# Patient Record
Sex: MALE | HISPANIC OR LATINO | Employment: OTHER | URBAN - METROPOLITAN AREA
[De-identification: names, ages, dates, MRNs, and addresses within clinical notes are randomized per-mention and may not be internally consistent; named-entity substitution may affect disease eponyms.]

---

## 2019-05-06 ENCOUNTER — HOSPITAL ENCOUNTER (INPATIENT)
Facility: OTHER | Age: 34
LOS: 2 days | Discharge: HOME | DRG: 392 | End: 2019-05-08
Attending: EMERGENCY MEDICINE | Admitting: INTERNAL MEDICINE

## 2019-05-06 DIAGNOSIS — K57.92 ACUTE DIVERTICULITIS: Primary | ICD-10-CM

## 2019-05-06 DIAGNOSIS — K57.32 DIVERTICULITIS OF SIGMOID COLON: ICD-10-CM

## 2019-05-06 DIAGNOSIS — K63.1 COLON PERFORATION: ICD-10-CM

## 2019-05-06 LAB
ALBUMIN SERPL BCP-MCNC: 3.8 G/DL (ref 3.5–5.2)
ALP SERPL-CCNC: 70 U/L (ref 55–135)
ALT SERPL W/O P-5'-P-CCNC: 26 U/L (ref 10–44)
ANION GAP SERPL CALC-SCNC: 8 MMOL/L (ref 8–16)
AST SERPL-CCNC: 11 U/L (ref 10–40)
BACTERIA #/AREA URNS HPF: NORMAL /HPF
BASOPHILS # BLD AUTO: 0.03 K/UL (ref 0–0.2)
BASOPHILS NFR BLD: 0.2 % (ref 0–1.9)
BILIRUB SERPL-MCNC: 1.1 MG/DL (ref 0.1–1)
BILIRUB UR QL STRIP: NEGATIVE
BUN SERPL-MCNC: 14 MG/DL (ref 6–20)
CALCIUM SERPL-MCNC: 9.5 MG/DL (ref 8.7–10.5)
CHLORIDE SERPL-SCNC: 108 MMOL/L (ref 95–110)
CLARITY UR: CLEAR
CO2 SERPL-SCNC: 24 MMOL/L (ref 23–29)
COLOR UR: YELLOW
CREAT SERPL-MCNC: 1.1 MG/DL (ref 0.5–1.4)
DIFFERENTIAL METHOD: ABNORMAL
EOSINOPHIL # BLD AUTO: 0.1 K/UL (ref 0–0.5)
EOSINOPHIL NFR BLD: 0.9 % (ref 0–8)
ERYTHROCYTE [DISTWIDTH] IN BLOOD BY AUTOMATED COUNT: 13.2 % (ref 11.5–14.5)
EST. GFR  (AFRICAN AMERICAN): >60 ML/MIN/1.73 M^2
EST. GFR  (NON AFRICAN AMERICAN): >60 ML/MIN/1.73 M^2
GLUCOSE SERPL-MCNC: 103 MG/DL (ref 70–110)
GLUCOSE UR QL STRIP: NEGATIVE
HCT VFR BLD AUTO: 42.6 % (ref 40–54)
HGB BLD-MCNC: 14.3 G/DL (ref 14–18)
HGB UR QL STRIP: ABNORMAL
KETONES UR QL STRIP: NEGATIVE
LACTATE SERPL-SCNC: 1.1 MMOL/L (ref 0.5–2.2)
LEUKOCYTE ESTERASE UR QL STRIP: NEGATIVE
LYMPHOCYTES # BLD AUTO: 2.7 K/UL (ref 1–4.8)
LYMPHOCYTES NFR BLD: 19.7 % (ref 18–48)
MCH RBC QN AUTO: 29.6 PG (ref 27–31)
MCHC RBC AUTO-ENTMCNC: 33.6 G/DL (ref 32–36)
MCV RBC AUTO: 88 FL (ref 82–98)
MICROSCOPIC COMMENT: NORMAL
MONOCYTES # BLD AUTO: 1.2 K/UL (ref 0.3–1)
MONOCYTES NFR BLD: 8.9 % (ref 4–15)
NEUTROPHILS # BLD AUTO: 9.4 K/UL (ref 1.8–7.7)
NEUTROPHILS NFR BLD: 69.9 % (ref 38–73)
NITRITE UR QL STRIP: NEGATIVE
PH UR STRIP: 6 [PH] (ref 5–8)
PLATELET # BLD AUTO: 281 K/UL (ref 150–350)
PMV BLD AUTO: 11.5 FL (ref 9.2–12.9)
POTASSIUM SERPL-SCNC: 3.9 MMOL/L (ref 3.5–5.1)
PROT SERPL-MCNC: 7.7 G/DL (ref 6–8.4)
PROT UR QL STRIP: NEGATIVE
RBC # BLD AUTO: 4.83 M/UL (ref 4.6–6.2)
RBC #/AREA URNS HPF: 2 /HPF (ref 0–4)
SODIUM SERPL-SCNC: 140 MMOL/L (ref 136–145)
SP GR UR STRIP: 1.02 (ref 1–1.03)
URN SPEC COLLECT METH UR: ABNORMAL
UROBILINOGEN UR STRIP-ACNC: NEGATIVE EU/DL
WBC # BLD AUTO: 13.45 K/UL (ref 3.9–12.7)

## 2019-05-06 PROCEDURE — 96375 TX/PRO/DX INJ NEW DRUG ADDON: CPT

## 2019-05-06 PROCEDURE — 94761 N-INVAS EAR/PLS OXIMETRY MLT: CPT

## 2019-05-06 PROCEDURE — 99285 EMERGENCY DEPT VISIT HI MDM: CPT | Mod: 25

## 2019-05-06 PROCEDURE — 25000003 PHARM REV CODE 250: Performed by: PHYSICIAN ASSISTANT

## 2019-05-06 PROCEDURE — 96365 THER/PROPH/DIAG IV INF INIT: CPT

## 2019-05-06 PROCEDURE — 63600175 PHARM REV CODE 636 W HCPCS: Performed by: PHYSICIAN ASSISTANT

## 2019-05-06 PROCEDURE — 96361 HYDRATE IV INFUSION ADD-ON: CPT

## 2019-05-06 PROCEDURE — 11000001 HC ACUTE MED/SURG PRIVATE ROOM

## 2019-05-06 PROCEDURE — 81000 URINALYSIS NONAUTO W/SCOPE: CPT

## 2019-05-06 PROCEDURE — 80053 COMPREHEN METABOLIC PANEL: CPT

## 2019-05-06 PROCEDURE — 99223 PR INITIAL HOSPITAL CARE,LEVL III: ICD-10-PCS | Mod: ,,, | Performed by: PHYSICIAN ASSISTANT

## 2019-05-06 PROCEDURE — 85025 COMPLETE CBC W/AUTO DIFF WBC: CPT

## 2019-05-06 PROCEDURE — 87040 BLOOD CULTURE FOR BACTERIA: CPT | Mod: 59

## 2019-05-06 PROCEDURE — 83605 ASSAY OF LACTIC ACID: CPT

## 2019-05-06 PROCEDURE — 99223 1ST HOSP IP/OBS HIGH 75: CPT | Mod: ,,, | Performed by: PHYSICIAN ASSISTANT

## 2019-05-06 RX ORDER — SODIUM CHLORIDE 9 MG/ML
INJECTION, SOLUTION INTRAVENOUS CONTINUOUS
Status: DISCONTINUED | OUTPATIENT
Start: 2019-05-06 | End: 2019-05-08

## 2019-05-06 RX ORDER — KETOROLAC TROMETHAMINE 30 MG/ML
10 INJECTION, SOLUTION INTRAMUSCULAR; INTRAVENOUS
Status: COMPLETED | OUTPATIENT
Start: 2019-05-06 | End: 2019-05-06

## 2019-05-06 RX ORDER — MORPHINE SULFATE 10 MG/ML
4 INJECTION INTRAMUSCULAR; INTRAVENOUS; SUBCUTANEOUS
Status: COMPLETED | OUTPATIENT
Start: 2019-05-06 | End: 2019-05-06

## 2019-05-06 RX ORDER — SODIUM CHLORIDE 9 MG/ML
1000 INJECTION, SOLUTION INTRAVENOUS
Status: COMPLETED | OUTPATIENT
Start: 2019-05-06 | End: 2019-05-06

## 2019-05-06 RX ORDER — MORPHINE SULFATE 2 MG/ML
2 INJECTION, SOLUTION INTRAMUSCULAR; INTRAVENOUS EVERY 4 HOURS PRN
Status: DISCONTINUED | OUTPATIENT
Start: 2019-05-06 | End: 2019-05-06

## 2019-05-06 RX ORDER — SODIUM CHLORIDE 0.9 % (FLUSH) 0.9 %
10 SYRINGE (ML) INJECTION
Status: DISCONTINUED | OUTPATIENT
Start: 2019-05-06 | End: 2019-05-08 | Stop reason: HOSPADM

## 2019-05-06 RX ORDER — MORPHINE SULFATE 2 MG/ML
1 INJECTION, SOLUTION INTRAMUSCULAR; INTRAVENOUS EVERY 4 HOURS PRN
Status: DISCONTINUED | OUTPATIENT
Start: 2019-05-06 | End: 2019-05-08 | Stop reason: HOSPADM

## 2019-05-06 RX ADMIN — KETOROLAC TROMETHAMINE 10 MG: 30 INJECTION, SOLUTION INTRAMUSCULAR at 04:05

## 2019-05-06 RX ADMIN — SODIUM CHLORIDE 1000 ML: 0.9 INJECTION, SOLUTION INTRAVENOUS at 05:05

## 2019-05-06 RX ADMIN — PIPERACILLIN AND TAZOBACTAM 4.5 G: 4; .5 INJECTION, POWDER, LYOPHILIZED, FOR SOLUTION INTRAVENOUS; PARENTERAL at 05:05

## 2019-05-06 RX ADMIN — SODIUM CHLORIDE: 0.9 INJECTION, SOLUTION INTRAVENOUS at 08:05

## 2019-05-06 RX ADMIN — MORPHINE SULFATE 4 MG: 10 INJECTION INTRAVENOUS at 05:05

## 2019-05-06 RX ADMIN — MORPHINE SULFATE 1 MG: 2 INJECTION, SOLUTION INTRAMUSCULAR; INTRAVENOUS at 09:05

## 2019-05-06 RX ADMIN — SODIUM CHLORIDE 1000 ML: 0.9 INJECTION, SOLUTION INTRAVENOUS at 04:05

## 2019-05-06 NOTE — ED NOTES
Maribel Linton PA-C at  reviewing results and discussing plan of care with patient and pt's family member.

## 2019-05-06 NOTE — ED TRIAGE NOTES
Pt reports testicle pain since last night. Reports pain originates in lower abd and radiates down into her testicle. Pt reports body aches. Denies any fever or n/v/d. Reports dysuria.

## 2019-05-06 NOTE — ED NOTES
Rounding on the patient has been done. he has been updated on the plan of care and his current status. Pain was assessed and is currently a 3/10. Comfort positioning and restroom needs were addressed. Necessary items were placed with in his reach and he was advised when a reassessment would take place. The call bell remains at the bedside for any additional patient needs. The patient is resting comfortably on the stretcher, respirations are even and unlabored, skin warm and dry. Pt's dtr at BS. Will continue to monitor.

## 2019-05-06 NOTE — HPI
Mr. Ulloa is a 34 year old man who presented to the ED with 2 days of intermittent sharp pains in both testicles associated with urinary frequency, dysuria and myalgias.  He does not have any testicular swelling or injury.  Evaluation in the ED showed a leukocytosis.  Clean catch UA showed 1+ blood and no leukocytes.  CT of the abdomen and pelvis was done and showed diverticulosis with mid sigmoid diverticulitis with possible micro perforations but no organized fluid collection.  He was started on Zosyn and admitted.

## 2019-05-06 NOTE — ED NOTES
NPO:   NPO status maintained and reinforced. Pt verbalized understanding. Will continue to monitor.

## 2019-05-06 NOTE — ED PROVIDER NOTES
Encounter Date: 5/6/2019       History     Chief Complaint   Patient presents with    Testicle Pain     Pt reports testicular pain since last night. Denies swelling. Pt reports dysuria and frequency.      Patient is a 34-year-old Arabic-speaking male with no significant medical history who presents to the emergency department with his daughter.  Daughter is translating for him.  Patient states over the last 2 days he has had sharp pains radiating to both testicles.  He states the pain is intermittent.  He states the pain is worse with movement.  He denies nausea or vomiting.  He denies diarrhea.  He denies blood per rectum.  He does report urinary frequency and dysuria.  He denies any swelling noted to his testicles.  He denies any known injury. He states over the last 12 hr he has developed myalgias.    The history is provided by the patient and a relative. The history is limited by a language barrier.  used: lacey.     Review of patient's allergies indicates:  No Known Allergies  No past medical history on file.  No past surgical history on file.  No family history on file.  Social History     Tobacco Use    Smoking status: Not on file   Substance Use Topics    Alcohol use: Not on file    Drug use: Not on file     Review of Systems   Constitutional: Negative for activity change, appetite change, chills, fatigue and fever.   HENT: Negative for congestion, ear discharge, ear pain, postnasal drip, rhinorrhea, sore throat and trouble swallowing.    Respiratory: Negative for cough and shortness of breath.    Cardiovascular: Negative for chest pain.   Gastrointestinal: Negative for abdominal pain, blood in stool, constipation, diarrhea, nausea and vomiting.   Genitourinary: Positive for dysuria and testicular pain. Negative for flank pain and hematuria.   Musculoskeletal: Positive for myalgias. Negative for back pain, neck pain and neck stiffness.   Skin: Negative for rash and wound.    Neurological: Negative for dizziness, weakness, light-headedness and headaches.       Physical Exam     Initial Vitals [05/06/19 1503]   BP Pulse Resp Temp SpO2   136/76 92 18 98.9 °F (37.2 °C) 99 %      MAP       --         Physical Exam    Nursing note and vitals reviewed.  Constitutional: He appears well-developed and well-nourished. He is not diaphoretic.  Non-toxic appearance. No distress.   HENT:   Head: Normocephalic.   Right Ear: Hearing and external ear normal.   Left Ear: Hearing and external ear normal.   Nose: Nose normal.   Mouth/Throat: Uvula is midline, oropharynx is clear and moist and mucous membranes are normal. No oropharyngeal exudate.   Eyes: Conjunctivae are normal. Pupils are equal, round, and reactive to light.   Neck: Normal range of motion.   Cardiovascular: Normal rate and regular rhythm.   Pulmonary/Chest: Breath sounds normal.   Abdominal: Soft. Bowel sounds are normal. He exhibits no distension and no mass. There is tenderness. There is guarding. There is no rebound (suprapubic), no CVA tenderness, no tenderness at McBurney's point and negative Temple's sign.   Genitourinary: Testes normal. Right testis shows no mass, no swelling and no tenderness. Cremasteric reflex is not absent on the right side. Left testis shows no mass, no swelling and no tenderness. Cremasteric reflex is not absent on the left side. Uncircumcised. No penile erythema. No discharge found.   Lymphadenopathy:     He has no cervical adenopathy. No inguinal adenopathy noted on the right or left side.   Neurological: He is alert and oriented to person, place, and time.   Skin: Skin is warm and dry. Capillary refill takes less than 2 seconds.   Psychiatric: He has a normal mood and affect.         ED Course   Procedures  Labs Reviewed   URINALYSIS, REFLEX TO URINE CULTURE - Abnormal; Notable for the following components:       Result Value    Occult Blood UA 1+ (*)     All other components within normal limits     Narrative:     Preferred Collection Type->Urine, Clean Catch   CBC W/ AUTO DIFFERENTIAL - Abnormal; Notable for the following components:    WBC 13.45 (*)     Gran # (ANC) 9.4 (*)     Mono # 1.2 (*)     All other components within normal limits   COMPREHENSIVE METABOLIC PANEL - Abnormal; Notable for the following components:    Total Bilirubin 1.1 (*)     All other components within normal limits   CULTURE, BLOOD   CULTURE, BLOOD   URINALYSIS MICROSCOPIC    Narrative:     Preferred Collection Type->Urine, Clean Catch   LACTIC ACID, PLASMA          Imaging Results           CT Renal Stone Study ABD Pelvis WO (Final result)  Result time 05/06/19 16:36:12    Final result by Kody Palmer MD (05/06/19 16:36:12)                 Impression:      This report was flagged in Epic as abnormal.    1. Findings most consistent with acute sigmoid diverticulitis.  Punctate foci of air adjacent to the involve segment suggest sequela of micro perforation without focal organized fluid collection at this time.  Early abscess however is not excluded in this region.  Follow-up is advised.  Additionally, consider follow-up colonoscopy to exclude underlying mass.  2. Bilateral nonobstructive nephrolithiasis without findings to suggest obstructive uropathy.  3. Possible hepatic steatosis, correlation with LFTs recommended.  4. 0.4 cm pulmonary nodule within the left lower lobe.  For a solid nodule <6 mm, Fleischner Society 2017 guidelines recommend no routine follow up for a low risk patient, or follow-up with non-contrast chest CT at 12 months in a high risk patient.  5. Additional findings above.      Electronically signed by: Kody Palmer MD  Date:    05/06/2019  Time:    16:36             Narrative:    EXAMINATION:  CT RENAL STONE STUDY ABD PELVIS WO    CLINICAL HISTORY:  Hematuria;    TECHNIQUE:  Low dose axial images, sagittal and coronal reformations were obtained from the lung bases to the pubic symphysis.  Contrast was not  administered.    COMPARISON:  None    FINDINGS:  Images of the lower thorax are remarkable for bilateral dependent atelectasis.  There is a pulmonary nodule within the left lower lobe measuring 0.4 cm.    There is mild gynecomastia.  The liver is slightly hypoattenuating, possibly reflecting steatosis, correlation with LFTs recommended.  The spleen, pancreas, gallbladder and adrenal glands are grossly unremarkable.  The stomach is mildly distended with ingested content.  The pancreatic duct is not dilated.  No significant abdominal lymphadenopathy.    There is left and right nonobstructive nephrolithiasis.  No hydronephrosis.  The bilateral ureters are unremarkable without calculi seen.  The urinary bladder is decompressed.  The prostate is not enlarged.    There are a few scattered colonic diverticula.  There is inflammation and indistinctness about a short segment of mid sigmoid colon, in the region of diverticula most consistent with acute diverticulitis.  There are a few punctate foci of air along the medial margin of the affected bowel, concerning for small contained micro perforation.  No large volume free air or focal organized collection at this time.  There is strand-like fluid in the region.  Diverticula are noted elsewhere along the colon without additional regions of inflammation.  The appendix and terminal ileum are unremarkable.  The small bowel is grossly unremarkable.  No bulky pelvic lymphadenopathy.    No focal osseous destructive process.  No significant inguinal lymphadenopathy.                                 Medical Decision Making:   Initial Assessment:   Urgent evaluation of a 34-year-old male with no significant medical history who presents to the emergency department with testicular pain.  Patient is afebrile, nontoxic appearing, and hemodynamically stable. On exam, no acute findings of the testicles.  No adenopathy.  No hernia noted. There is tenderness in the suprapubic region.  No CVA  tenderness. With patient's initial complaints and presentation, my differential diagnosis included UTI, STD, renal colic, obstructive nephrolithiasis.  Lab work and CT renal stone study will be obtained at this time.  Patient will be given fluids and Toradol.    Clinical Tests:   Lab Tests: Ordered and Reviewed  Radiological Study: Ordered and Reviewed  ED Management:  1535  U/A reveals blood.    1640  Ct scan reveals acute sigmoid diverticulitis with micro perforation noted adjacent to the involving segment.  Early abscess is not excluded.  There is incidental finding of bilateral nonobstructive nephrolithiasis.  There is incidental pulmonary nodule noted.  Pt is advised of all findings.  I have paged general surgery at this time to discuss case.    6110  Dr. Bains advised admission to hospital medicine.  Have contacted case management to determine if patient meets obs or inpatient criteria.    5:14 PM  Pt does meet inpatient.  Will page hospital medicine right now.    5:20 PM  Admitted to Dr. Hoang.  Other:   I have discussed this case with another health care provider.       <> Summary of the Discussion: Dr. Bains, Dr. Ott, Dr. Nuñez                      Clinical Impression:       ICD-10-CM ICD-9-CM   1. Acute diverticulitis K57.92 562.11   2. Colon perforation K63.1 569.83                                Maribel Hairston PA-C  05/06/19 4215

## 2019-05-06 NOTE — ED NOTES
ROUNDING:  Lying on the stretcher with HOB elevated. AAOx4. Calm, cooperative and smiling. States abdominal (below umbilicus) pain  6/10. States pain comes and goes.  States lower back pain has resolved. Denies n/v, cp, sob, dizziness, lightheadedness or any other discomfort at this time. Skin is warm and dry. Resp:18 even and unlabored. Comfort and BR needs addressed. Plan of care updated. NADN. Dtr at BS. Bed locked in low position, side rails up x2 and call light within reach. Will continue to monitor.

## 2019-05-07 PROBLEM — K57.32 DIVERTICULITIS OF SIGMOID COLON: Status: ACTIVE | Noted: 2019-05-06

## 2019-05-07 PROCEDURE — 94761 N-INVAS EAR/PLS OXIMETRY MLT: CPT

## 2019-05-07 PROCEDURE — 11000001 HC ACUTE MED/SURG PRIVATE ROOM

## 2019-05-07 PROCEDURE — 99233 PR SUBSEQUENT HOSPITAL CARE,LEVL III: ICD-10-PCS | Mod: ,,, | Performed by: HOSPITALIST

## 2019-05-07 PROCEDURE — 99233 SBSQ HOSP IP/OBS HIGH 50: CPT | Mod: ,,, | Performed by: HOSPITALIST

## 2019-05-07 PROCEDURE — 63600175 PHARM REV CODE 636 W HCPCS: Performed by: PHYSICIAN ASSISTANT

## 2019-05-07 PROCEDURE — 25000003 PHARM REV CODE 250: Performed by: PHYSICIAN ASSISTANT

## 2019-05-07 RX ORDER — ENOXAPARIN SODIUM 100 MG/ML
40 INJECTION SUBCUTANEOUS EVERY 24 HOURS
Status: DISCONTINUED | OUTPATIENT
Start: 2019-05-07 | End: 2019-05-08 | Stop reason: HOSPADM

## 2019-05-07 RX ADMIN — PIPERACILLIN AND TAZOBACTAM 4.5 G: 4; .5 INJECTION, POWDER, LYOPHILIZED, FOR SOLUTION INTRAVENOUS; PARENTERAL at 05:05

## 2019-05-07 RX ADMIN — PIPERACILLIN AND TAZOBACTAM 4.5 G: 4; .5 INJECTION, POWDER, LYOPHILIZED, FOR SOLUTION INTRAVENOUS; PARENTERAL at 10:05

## 2019-05-07 RX ADMIN — ENOXAPARIN SODIUM 40 MG: 100 INJECTION SUBCUTANEOUS at 05:05

## 2019-05-07 RX ADMIN — SODIUM CHLORIDE: 0.9 INJECTION, SOLUTION INTRAVENOUS at 04:05

## 2019-05-07 RX ADMIN — PIPERACILLIN AND TAZOBACTAM 4.5 G: 4; .5 INJECTION, POWDER, LYOPHILIZED, FOR SOLUTION INTRAVENOUS; PARENTERAL at 12:05

## 2019-05-07 NOTE — PLAN OF CARE
Problem: Adult Inpatient Plan of Care  Goal: Plan of Care Review  Outcome: Ongoing (interventions implemented as appropriate)  Patient free from falls or injury throughout shift. Pain treated with IV pain meds per MAR. Purposeful rounding completed. All safety measures in place. Will continue to monitor.

## 2019-05-07 NOTE — PROGRESS NOTES
[facility] Hospital Medicine  Progress Note    Patient Name: Morgan Ulloa  MRN: [id number]  Patient Class: IP- Inpatient   Admission Date: 5/6/2019  Length of Stay: 1 days  Attending Physician: Luna Wilson MD  Primary Care Provider: [facility]        Subjective:     Principal Problem:Diverticulitis of sigmoid colon    HPI:  Mr. Ulloa is a 34 year old man who presented to the ED with 2 days of intermittent sharp pains in both testicles associated with urinary frequency, dysuria and myalgias.  He does not have any testicular swelling or injury.  Evaluation in the ED showed a leukocytosis.  Clean catch UA showed 1+ blood and no leukocytes.  CT of the abdomen and pelvis was done and showed diverticulosis with mid sigmoid diverticulitis with possible micro perforations but no organized fluid collection.  He was started on Zosyn and admitted.    Hospital Course:  Patient was made NPO on admission and was evaluated by general surgery.  He was continued on IV Zosyn to cover diverticulitis as well as any potential urinary tract pathogens.    Interval History:  His pain has improved on antibiotics.  Feels hungry and thirsty.  Patient's daughter assisted with interpretation.    Review of Systems   Constitutional: Negative for chills and fever.   Respiratory: Negative for cough and shortness of breath.    Cardiovascular: Negative for chest pain and palpitations.     Objective:     Vital Signs (Most Recent):  Temp: 97.9 °F (36.6 °C) (05/07/19 1143)  Pulse: 74 (05/07/19 1143)  Resp: 20 (05/07/19 1143)  BP: 127/75 (05/07/19 1143)  SpO2: 96 % (05/07/19 1143) Vital Signs (24h Range):  Temp:  [96.6 °F (35.9 °C)-98.9 °F (37.2 °C)] 97.9 °F (36.6 °C)  Pulse:  [74-92] 74  Resp:  [16-20] 20  SpO2:  [96 %-99 %] 96 %  BP: (110-141)/(55-91) 127/75     Weight: 112.1 kg (247 lb 2.2 oz)  Body mass index is 39.89 kg/m².  No intake or output data in the 24 hours ending 05/07/19 1339   Physical Exam    Constitutional: He is oriented to person, place, and time. He appears well-developed and well-nourished.   HENT:   Head: Normocephalic.   Eyes: Pupils are equal, round, and reactive to light. Conjunctivae are normal.   Neck: Neck supple. No thyromegaly present.   Cardiovascular: Normal rate, regular rhythm, normal heart sounds and intact distal pulses. Exam reveals no gallop and no friction rub.   No murmur heard.  Pulmonary/Chest: Effort normal and breath sounds normal.   Abdominal: Soft. Bowel sounds are normal. He exhibits no distension. There is no tenderness.   Musculoskeletal: Normal range of motion. He exhibits no edema.   Lymphadenopathy:     He has no cervical adenopathy.   Neurological: He is alert and oriented to person, place, and time.   Strength equal and symmetric   Skin: Skin is warm and dry. No rash noted.   Psychiatric: He has a normal mood and affect. His behavior is normal. Thought content normal.       Significant Labs:   CBC:   Recent Labs   Lab 05/06/19  1607   WBC 13.45*   HGB 14.3   HCT 42.6          Significant Imaging: I have reviewed all pertinent imaging results/findings within the past 24 hours.    Assessment/Plan:      * Diverticulitis of sigmoid colon  - Diverticulitis with micro perforation, no organized fluid collection seen.  - continue zosyn, IV fluids, pain medication.  OK to try clear liquids.  - Surgery following      VTE Risk Mitigation (From admission, onward)        Ordered     enoxaparin injection 40 mg  Daily      05/07/19 0526     IP VTE HIGH RISK PATIENT  Once      05/07/19 0526     Place sequential compression device  Until discontinued      05/07/19 0526     Place BRIDGET hose  Until discontinued      05/06/19 1940              Luna Muniz MD  Department of Hospital Medicine   [facility]

## 2019-05-07 NOTE — PLAN OF CARE
SW met with pt at bedside and doesn't speak English and allowed daughter to answer, Dick, assessment questions. Pt goes to [facility] on [address].  Daughter not sure who will provide transportation home and pt may need assistance with medications.     05/07/19 0942   Discharge Assessment   Assessment Type Discharge Planning Assessment   Confirmed/corrected address and phone number on facesheet? Yes   Assessment information obtained from? Caregiver   Communicated expected length of stay with patient/caregiver no   Prior to hospitilization cognitive status: Alert/Oriented   Prior to hospitalization functional status: Independent   Current cognitive status: Alert/Oriented   Current Functional Status: Independent   Lives With child(tesha), adult;spouse;child(tesha), dependent   Able to Return to Prior Arrangements yes   Is patient able to care for self after discharge? Yes   Readmission Within the Last 30 Days no previous admission in last 30 days   Patient currently being followed by outpatient case management? No   Patient currently receives any other outside agency services? No   Equipment Currently Used at Home none   Do you have any problems affording any of your prescribed medications? TBD   Is the patient taking medications as prescribed? yes   Does the patient have transportation home? No   Does the patient receive services at the [facility]? No   Discharge Plan A Home   DME Needed Upon Discharge  none   Patient/Family in Agreement with Plan yes

## 2019-05-07 NOTE — ASSESSMENT & PLAN NOTE
- Mr. Morgan Ulloa is admitted to inpatient status  - he has diverticulitis with micro perforation   - continue zosyn  - NPO  - IV fluids  - Surgery consulted   - PRN pain meds

## 2019-05-07 NOTE — ASSESSMENT & PLAN NOTE
- Diverticulitis with micro perforation, no organized fluid collection seen.  - continue zosyn, IV fluids, pain medication.  OK to try clear liquids.  - Surgery following

## 2019-05-07 NOTE — SUBJECTIVE & OBJECTIVE
Interval History:  His pain has improved on antibiotics.  Feels hungry and thirsty.  Patient's daughter assisted with interpretation.    Review of Systems   Constitutional: Negative for chills and fever.   Respiratory: Negative for cough and shortness of breath.    Cardiovascular: Negative for chest pain and palpitations.     Objective:     Vital Signs (Most Recent):  Temp: 97.9 °F (36.6 °C) (05/07/19 1143)  Pulse: 74 (05/07/19 1143)  Resp: 20 (05/07/19 1143)  BP: 127/75 (05/07/19 1143)  SpO2: 96 % (05/07/19 1143) Vital Signs (24h Range):  Temp:  [96.6 °F (35.9 °C)-98.9 °F (37.2 °C)] 97.9 °F (36.6 °C)  Pulse:  [74-92] 74  Resp:  [16-20] 20  SpO2:  [96 %-99 %] 96 %  BP: (110-141)/(55-91) 127/75     Weight: 112.1 kg (247 lb 2.2 oz)  Body mass index is 39.89 kg/m².  No intake or output data in the 24 hours ending 05/07/19 1339   Physical Exam   Constitutional: He is oriented to person, place, and time. He appears well-developed and well-nourished.   HENT:   Head: Normocephalic.   Eyes: Pupils are equal, round, and reactive to light. Conjunctivae are normal.   Neck: Neck supple. No thyromegaly present.   Cardiovascular: Normal rate, regular rhythm, normal heart sounds and intact distal pulses. Exam reveals no gallop and no friction rub.   No murmur heard.  Pulmonary/Chest: Effort normal and breath sounds normal.   Abdominal: Soft. Bowel sounds are normal. He exhibits no distension. There is no tenderness.   Musculoskeletal: Normal range of motion. He exhibits no edema.   Lymphadenopathy:     He has no cervical adenopathy.   Neurological: He is alert and oriented to person, place, and time.   Strength equal and symmetric   Skin: Skin is warm and dry. No rash noted.   Psychiatric: He has a normal mood and affect. His behavior is normal. Thought content normal.       Significant Labs:   CBC:   Recent Labs   Lab 05/06/19  1607   WBC 13.45*   HGB 14.3   HCT 42.6          Significant Imaging: I have reviewed all  pertinent imaging results/findings within the past 24 hours.

## 2019-05-07 NOTE — SUBJECTIVE & OBJECTIVE
History reviewed. No pertinent past medical history.    History reviewed. No pertinent surgical history.    Review of patient's allergies indicates:  No Known Allergies    No current facility-administered medications on file prior to encounter.      No current outpatient medications on file prior to encounter.     Family History     None        Tobacco Use    Smoking status: Former Smoker    Smokeless tobacco: Never Used   Substance and Sexual Activity    Alcohol use: Not on file    Drug use: Not on file    Sexual activity: Not on file     Review of Systems   Constitutional: Positive for diaphoresis and fever (subjective). Negative for chills.   Respiratory: Negative for cough, shortness of breath and wheezing.    Cardiovascular: Negative for chest pain and palpitations.   Gastrointestinal: Positive for abdominal pain. Negative for blood in stool, diarrhea, nausea and vomiting.   Genitourinary: Negative for decreased urine volume, difficulty urinating, dysuria, frequency, hematuria and urgency.   Musculoskeletal: Negative for back pain.   Skin: Negative for rash and wound.   Neurological: Negative for dizziness, weakness, light-headedness and headaches.   Hematological: Does not bruise/bleed easily.   Psychiatric/Behavioral: Negative for agitation, confusion and decreased concentration.     Objective:     Vital Signs (Most Recent):  Temp: 96.6 °F (35.9 °C) (05/07/19 0457)  Pulse: 77 (05/07/19 0457)  Resp: 18 (05/07/19 0457)  BP: (!) 110/55 (05/07/19 0457)  SpO2: 97 % (05/07/19 0457) Vital Signs (24h Range):  Temp:  [96.6 °F (35.9 °C)-98.9 °F (37.2 °C)] 96.6 °F (35.9 °C)  Pulse:  [77-92] 77  Resp:  [16-18] 18  SpO2:  [97 %-99 %] 97 %  BP: (110-141)/(55-81) 110/55     Weight: 112.1 kg (247 lb 2.2 oz)  Body mass index is 39.89 kg/m².    Physical Exam   Constitutional: He is oriented to person, place, and time. Vital signs are normal. He appears well-developed and well-nourished.  Non-toxic appearance. He does not  have a sickly appearance. He does not appear ill. No distress.   HENT:   Head: Normocephalic and atraumatic.   Right Ear: External ear normal.   Left Ear: External ear normal.   Eyes: Pupils are equal, round, and reactive to light. Conjunctivae and EOM are normal. No scleral icterus.   Neck: Normal range of motion. Neck supple. No JVD present. No tracheal deviation present.   Cardiovascular: Normal rate, regular rhythm, normal heart sounds and intact distal pulses. Exam reveals no gallop and no friction rub.   No murmur heard.  Pulmonary/Chest: Effort normal and breath sounds normal. No stridor. No respiratory distress. He has no wheezes.   Abdominal: Soft. Bowel sounds are normal. He exhibits no distension and no mass. There is tenderness (suprapubic). There is no guarding.   Neurological: He is alert and oriented to person, place, and time.   Skin: Skin is warm and dry. He is not diaphoretic.   Psychiatric: He has a normal mood and affect. His behavior is normal. Judgment and thought content normal.   Nursing note and vitals reviewed.        CRANIAL NERVES     CN III, IV, VI   Pupils are equal, round, and reactive to light.  Extraocular motions are normal.        Significant Labs:   BMP:   Recent Labs   Lab 05/06/19  1607         K 3.9      CO2 24   BUN 14   CREATININE 1.1   CALCIUM 9.5     CBC:   Recent Labs   Lab 05/06/19  1607   WBC 13.45*   HGB 14.3   HCT 42.6        CMP:   Recent Labs   Lab 05/06/19  1607      K 3.9      CO2 24      BUN 14   CREATININE 1.1   CALCIUM 9.5   PROT 7.7   ALBUMIN 3.8   BILITOT 1.1*   ALKPHOS 70   AST 11   ALT 26   ANIONGAP 8   EGFRNONAA >60     Urine Culture: No results for input(s): LABURIN in the last 48 hours.  Urine Studies:   Recent Labs   Lab 05/06/19  1534   COLORU Yellow   APPEARANCEUA Clear   PHUR 6.0   SPECGRAV 1.020   PROTEINUA Negative   GLUCUA Negative   KETONESU Negative   BILIRUBINUA Negative   OCCULTUA 1+*   NITRITE  Negative   UROBILINOGEN Negative   LEUKOCYTESUR Negative   RBCUA 2   BACTERIA Rare     All pertinent labs within the past 24 hours have been reviewed.    Significant Imaging: I have reviewed all pertinent imaging results/findings within the past 24 hours.   Imaging Results           CT Renal Stone Study ABD Pelvis WO (Final result)  Result time 05/06/19 16:36:12    Final result by Kody Palmer MD (05/06/19 16:36:12)                 Impression:      This report was flagged in Epic as abnormal.    1. Findings most consistent with acute sigmoid diverticulitis.  Punctate foci of air adjacent to the involve segment suggest sequela of micro perforation without focal organized fluid collection at this time.  Early abscess however is not excluded in this region.  Follow-up is advised.  Additionally, consider follow-up colonoscopy to exclude underlying mass.  2. Bilateral nonobstructive nephrolithiasis without findings to suggest obstructive uropathy.  3. Possible hepatic steatosis, correlation with LFTs recommended.  4. 0.4 cm pulmonary nodule within the left lower lobe.  For a solid nodule <6 mm, Fleischner Society 2017 guidelines recommend no routine follow up for a low risk patient, or follow-up with non-contrast chest CT at 12 months in a high risk patient.  5. Additional findings above.      Electronically signed by: Kody Palmer MD  Date:    05/06/2019  Time:    16:36             Narrative:    EXAMINATION:  CT RENAL STONE STUDY ABD PELVIS WO    CLINICAL HISTORY:  Hematuria;    TECHNIQUE:  Low dose axial images, sagittal and coronal reformations were obtained from the lung bases to the pubic symphysis.  Contrast was not administered.    COMPARISON:  None    FINDINGS:  Images of the lower thorax are remarkable for bilateral dependent atelectasis.  There is a pulmonary nodule within the left lower lobe measuring 0.4 cm.    There is mild gynecomastia.  The liver is slightly hypoattenuating, possibly reflecting  steatosis, correlation with LFTs recommended.  The spleen, pancreas, gallbladder and adrenal glands are grossly unremarkable.  The stomach is mildly distended with ingested content.  The pancreatic duct is not dilated.  No significant abdominal lymphadenopathy.    There is left and right nonobstructive nephrolithiasis.  No hydronephrosis.  The bilateral ureters are unremarkable without calculi seen.  The urinary bladder is decompressed.  The prostate is not enlarged.    There are a few scattered colonic diverticula.  There is inflammation and indistinctness about a short segment of mid sigmoid colon, in the region of diverticula most consistent with acute diverticulitis.  There are a few punctate foci of air along the medial margin of the affected bowel, concerning for small contained micro perforation.  No large volume free air or focal organized collection at this time.  There is strand-like fluid in the region.  Diverticula are noted elsewhere along the colon without additional regions of inflammation.  The appendix and terminal ileum are unremarkable.  The small bowel is grossly unremarkable.  No bulky pelvic lymphadenopathy.    No focal osseous destructive process.  No significant inguinal lymphadenopathy.

## 2019-05-07 NOTE — H&P
[facility] Hospital Medicine  History & Physical    Patient Name: Morgan Ulloa  MRN: [id number]  Admission Date: 5/6/2019  Attending Physician: Luna Wilsno MD   Primary Care Provider: Primary Doctor No         Patient information was obtained from patient, past medical records and ER records.     Subjective:     Principal Problem:Acute diverticulitis    Chief Complaint:   Chief Complaint   Patient presents with    Testicle Pain     Pt reports testicular pain since last night. Denies swelling. Pt reports dysuria and frequency.         HPI: Mr. Morgan Ulloa is a 34 y.o. male, with no significant PMH, who presented to [facility] ED on 5/6/19 2/2 intermittent testicular pain beginning 2 days ago. He states the over the past two days he has ad sharp pains radiating to both testicles, and is worse with movement. He reports associated symptoms of dysuria, urinary frequency and mylagias. He notes associated urinary frequency and dysuria. He denies nausea/vomiting, diarrhea, BRBPR, testicular swelling/injury. In the ED, he was evaluated and labs showed a leukocytosis. CT of abdomen & pelvis showed acute sigmoid diverticulitis with micro perforation without abscess. He was started on zosyn, and admitted to inpatient status.     History reviewed. No pertinent past medical history.    History reviewed. No pertinent surgical history.    Review of patient's allergies indicates:  No Known Allergies    No current facility-administered medications on file prior to encounter.      No current outpatient medications on file prior to encounter.     Family History     None        Tobacco Use    Smoking status: Former Smoker    Smokeless tobacco: Never Used   Substance and Sexual Activity    Alcohol use: Not on file    Drug use: Not on file    Sexual activity: Not on file     Review of Systems   Constitutional: Positive for diaphoresis and fever (subjective). Negative for chills.   Respiratory: Negative  for cough, shortness of breath and wheezing.    Cardiovascular: Negative for chest pain and palpitations.   Gastrointestinal: Positive for abdominal pain. Negative for blood in stool, diarrhea, nausea and vomiting.   Genitourinary: Negative for decreased urine volume, difficulty urinating, dysuria, frequency, hematuria and urgency.   Musculoskeletal: Negative for back pain.   Skin: Negative for rash and wound.   Neurological: Negative for dizziness, weakness, light-headedness and headaches.   Hematological: Does not bruise/bleed easily.   Psychiatric/Behavioral: Negative for agitation, confusion and decreased concentration.     Objective:     Vital Signs (Most Recent):  Temp: 96.6 °F (35.9 °C) (05/07/19 0457)  Pulse: 77 (05/07/19 0457)  Resp: 18 (05/07/19 0457)  BP: (!) 110/55 (05/07/19 0457)  SpO2: 97 % (05/07/19 0457) Vital Signs (24h Range):  Temp:  [96.6 °F (35.9 °C)-98.9 °F (37.2 °C)] 96.6 °F (35.9 °C)  Pulse:  [77-92] 77  Resp:  [16-18] 18  SpO2:  [97 %-99 %] 97 %  BP: (110-141)/(55-81) 110/55     Weight: 112.1 kg (247 lb 2.2 oz)  Body mass index is 39.89 kg/m².    Physical Exam   Constitutional: He is oriented to person, place, and time. Vital signs are normal. He appears well-developed and well-nourished.  Non-toxic appearance. He does not have a sickly appearance. He does not appear ill. No distress.   HENT:   Head: Normocephalic and atraumatic.   Right Ear: External ear normal.   Left Ear: External ear normal.   Eyes: Pupils are equal, round, and reactive to light. Conjunctivae and EOM are normal. No scleral icterus.   Neck: Normal range of motion. Neck supple. No JVD present. No tracheal deviation present.   Cardiovascular: Normal rate, regular rhythm, normal heart sounds and intact distal pulses. Exam reveals no gallop and no friction rub.   No murmur heard.  Pulmonary/Chest: Effort normal and breath sounds normal. No stridor. No respiratory distress. He has no wheezes.   Abdominal: Soft. Bowel sounds  are normal. He exhibits no distension and no mass. There is tenderness (suprapubic). There is no guarding.   Neurological: He is alert and oriented to person, place, and time.   Skin: Skin is warm and dry. He is not diaphoretic.   Psychiatric: He has a normal mood and affect. His behavior is normal. Judgment and thought content normal.   Nursing note and vitals reviewed.        CRANIAL NERVES     CN III, IV, VI   Pupils are equal, round, and reactive to light.  Extraocular motions are normal.        Significant Labs:   BMP:   Recent Labs   Lab 05/06/19  1607         K 3.9      CO2 24   BUN 14   CREATININE 1.1   CALCIUM 9.5     CBC:   Recent Labs   Lab 05/06/19  1607   WBC 13.45*   HGB 14.3   HCT 42.6        CMP:   Recent Labs   Lab 05/06/19  1607      K 3.9      CO2 24      BUN 14   CREATININE 1.1   CALCIUM 9.5   PROT 7.7   ALBUMIN 3.8   BILITOT 1.1*   ALKPHOS 70   AST 11   ALT 26   ANIONGAP 8   EGFRNONAA >60     Urine Culture: No results for input(s): LABURIN in the last 48 hours.  Urine Studies:   Recent Labs   Lab 05/06/19  1534   COLORU Yellow   APPEARANCEUA Clear   PHUR 6.0   SPECGRAV 1.020   PROTEINUA Negative   GLUCUA Negative   KETONESU Negative   BILIRUBINUA Negative   OCCULTUA 1+*   NITRITE Negative   UROBILINOGEN Negative   LEUKOCYTESUR Negative   RBCUA 2   BACTERIA Rare     All pertinent labs within the past 24 hours have been reviewed.    Significant Imaging: I have reviewed all pertinent imaging results/findings within the past 24 hours.   Imaging Results           CT Renal Stone Study ABD Pelvis WO (Final result)  Result time 05/06/19 16:36:12    Final result by Kody Palmer MD (05/06/19 16:36:12)                 Impression:      This report was flagged in Epic as abnormal.    1. Findings most consistent with acute sigmoid diverticulitis.  Punctate foci of air adjacent to the involve segment suggest sequela of micro perforation without focal organized  fluid collection at this time.  Early abscess however is not excluded in this region.  Follow-up is advised.  Additionally, consider follow-up colonoscopy to exclude underlying mass.  2. Bilateral nonobstructive nephrolithiasis without findings to suggest obstructive uropathy.  3. Possible hepatic steatosis, correlation with LFTs recommended.  4. 0.4 cm pulmonary nodule within the left lower lobe.  For a solid nodule <6 mm, Fleischner Society 2017 guidelines recommend no routine follow up for a low risk patient, or follow-up with non-contrast chest CT at 12 months in a high risk patient.  5. Additional findings above.      Electronically signed by: Kody Palmer MD  Date:    05/06/2019  Time:    16:36             Narrative:    EXAMINATION:  CT RENAL STONE STUDY ABD PELVIS WO    CLINICAL HISTORY:  Hematuria;    TECHNIQUE:  Low dose axial images, sagittal and coronal reformations were obtained from the lung bases to the pubic symphysis.  Contrast was not administered.    COMPARISON:  None    FINDINGS:  Images of the lower thorax are remarkable for bilateral dependent atelectasis.  There is a pulmonary nodule within the left lower lobe measuring 0.4 cm.    There is mild gynecomastia.  The liver is slightly hypoattenuating, possibly reflecting steatosis, correlation with LFTs recommended.  The spleen, pancreas, gallbladder and adrenal glands are grossly unremarkable.  The stomach is mildly distended with ingested content.  The pancreatic duct is not dilated.  No significant abdominal lymphadenopathy.    There is left and right nonobstructive nephrolithiasis.  No hydronephrosis.  The bilateral ureters are unremarkable without calculi seen.  The urinary bladder is decompressed.  The prostate is not enlarged.    There are a few scattered colonic diverticula.  There is inflammation and indistinctness about a short segment of mid sigmoid colon, in the region of diverticula most consistent with acute diverticulitis.  There  are a few punctate foci of air along the medial margin of the affected bowel, concerning for small contained micro perforation.  No large volume free air or focal organized collection at this time.  There is strand-like fluid in the region.  Diverticula are noted elsewhere along the colon without additional regions of inflammation.  The appendix and terminal ileum are unremarkable.  The small bowel is grossly unremarkable.  No bulky pelvic lymphadenopathy.    No focal osseous destructive process.  No significant inguinal lymphadenopathy.                                 Assessment/Plan:     * Acute diverticulitis  - Mr. Morgan Ulloa is admitted to inpatient status  - he has diverticulitis with micro perforation   - continue zosyn  - NPO  - IV fluids  - Surgery consulted   - PRN pain meds     VTE Risk Mitigation (From admission, onward)        Ordered     enoxaparin injection 40 mg  Daily      05/07/19 0526     IP VTE HIGH RISK PATIENT  Once      05/07/19 0526     Place sequential compression device  Until discontinued      05/07/19 0526     Place BRIDGET hose  Until discontinued      05/06/19 1940             Cynthia Jackman PA-C  Department of Hospital Medicine   [facility]

## 2019-05-07 NOTE — CONSULTS
34yoHM admitted with acute tics on CT and suprapubic pain.  Less pain today.  Mild suprapubic tenderness.  Wbc 13.5  Hct 42.6  Imp: Uncomplicated diverticulitis  Observe on IV fluids, Zosyn and bowel rest.

## 2019-05-08 VITALS
BODY MASS INDEX: 39.72 KG/M2 | HEART RATE: 68 BPM | OXYGEN SATURATION: 96 % | TEMPERATURE: 98 F | HEIGHT: 66 IN | SYSTOLIC BLOOD PRESSURE: 119 MMHG | DIASTOLIC BLOOD PRESSURE: 60 MMHG | RESPIRATION RATE: 18 BRPM | WEIGHT: 247.13 LBS

## 2019-05-08 LAB
ANION GAP SERPL CALC-SCNC: 10 MMOL/L (ref 8–16)
BUN SERPL-MCNC: 10 MG/DL (ref 6–20)
CALCIUM SERPL-MCNC: 9.5 MG/DL (ref 8.7–10.5)
CHLORIDE SERPL-SCNC: 108 MMOL/L (ref 95–110)
CO2 SERPL-SCNC: 22 MMOL/L (ref 23–29)
CREAT SERPL-MCNC: 1.2 MG/DL (ref 0.5–1.4)
EST. GFR  (AFRICAN AMERICAN): >60 ML/MIN/1.73 M^2
EST. GFR  (NON AFRICAN AMERICAN): >60 ML/MIN/1.73 M^2
GLUCOSE SERPL-MCNC: 80 MG/DL (ref 70–110)
POTASSIUM SERPL-SCNC: 4 MMOL/L (ref 3.5–5.1)
SODIUM SERPL-SCNC: 140 MMOL/L (ref 136–145)

## 2019-05-08 PROCEDURE — 99238 PR HOSPITAL DISCHARGE DAY,<30 MIN: ICD-10-PCS | Mod: ,,, | Performed by: HOSPITALIST

## 2019-05-08 PROCEDURE — 63600175 PHARM REV CODE 636 W HCPCS: Performed by: PHYSICIAN ASSISTANT

## 2019-05-08 PROCEDURE — 94761 N-INVAS EAR/PLS OXIMETRY MLT: CPT

## 2019-05-08 PROCEDURE — 25000003 PHARM REV CODE 250: Performed by: PHYSICIAN ASSISTANT

## 2019-05-08 PROCEDURE — 25000003 PHARM REV CODE 250: Performed by: HOSPITALIST

## 2019-05-08 PROCEDURE — 80048 BASIC METABOLIC PNL TOTAL CA: CPT

## 2019-05-08 PROCEDURE — 99238 HOSP IP/OBS DSCHRG MGMT 30/<: CPT | Mod: ,,, | Performed by: HOSPITALIST

## 2019-05-08 PROCEDURE — 36415 COLL VENOUS BLD VENIPUNCTURE: CPT

## 2019-05-08 RX ORDER — CIPROFLOXACIN 500 MG/1
500 TABLET ORAL EVERY 12 HOURS
Status: DISCONTINUED | OUTPATIENT
Start: 2019-05-08 | End: 2019-05-08 | Stop reason: HOSPADM

## 2019-05-08 RX ORDER — CIPROFLOXACIN 500 MG/1
500 TABLET ORAL EVERY 12 HOURS
Qty: 14 TABLET | Refills: 0 | Status: SHIPPED | OUTPATIENT
Start: 2019-05-08 | End: 2019-05-15

## 2019-05-08 RX ORDER — METRONIDAZOLE 500 MG/1
500 TABLET ORAL EVERY 8 HOURS
Qty: 21 TABLET | Refills: 0 | Status: SHIPPED | OUTPATIENT
Start: 2019-05-08 | End: 2019-05-15

## 2019-05-08 RX ORDER — METRONIDAZOLE 500 MG/1
500 TABLET ORAL EVERY 8 HOURS
Status: DISCONTINUED | OUTPATIENT
Start: 2019-05-08 | End: 2019-05-08 | Stop reason: HOSPADM

## 2019-05-08 RX ADMIN — PIPERACILLIN AND TAZOBACTAM 4.5 G: 4; .5 INJECTION, POWDER, LYOPHILIZED, FOR SOLUTION INTRAVENOUS; PARENTERAL at 12:05

## 2019-05-08 RX ADMIN — METRONIDAZOLE 500 MG: 500 TABLET ORAL at 01:05

## 2019-05-08 RX ADMIN — CIPROFLOXACIN HYDROCHLORIDE 500 MG: 500 TABLET, FILM COATED ORAL at 09:05

## 2019-05-08 NOTE — NURSING
Pt in NAD, denies pain or SOB.  Pt and daughter educated on discharge instructions and they both verbalized understanding.  All questions answered.  IV removed without complication.  Daughter to  meds from [facility], refused delivery to pt room.  Pt refused transport.

## 2019-05-08 NOTE — PLAN OF CARE
05/08/19 1624   Final Note   Assessment Type Final Discharge Note   Anticipated Discharge Disposition Home   What phone number can be called within the next 1-3 days to see how you are doing after discharge?   ([phone])   Hospital Follow Up  Appt(s) scheduled? No   Discharge plans and expectations educations in teach back method with documentation complete? No

## 2019-05-08 NOTE — DISCHARGE SUMMARY
[facility] Hospital Medicine  Discharge Summary      Patient Name: Morgan Ulloa  MRN: [id number]  Admission Date: 5/6/2019  Hospital Length of Stay: 2 days  Discharge Date and Time:  05/08/2019 1:14 PM  Attending Physician: Luna Wilson MD   Discharging Provider: Luna Wilson MD  Primary Care Provider: Chel Vo KtahrynShun      HPI:   Mr. Ulloa is a 34 year old man who presented to the ED with 2 days of intermittent sharp pains in both testicles associated with urinary frequency, dysuria and myalgias.  He does not have any testicular swelling or injury.  Evaluation in the ED showed a leukocytosis.  Clean catch UA showed 1+ blood and no leukocytes.  CT of the abdomen and pelvis was done and showed diverticulosis with mid sigmoid diverticulitis with possible micro perforations but no organized fluid collection.  He was started on Zosyn and admitted.          Hospital Course:   Patient was made NPO on admission and was evaluated by general surgery given the micro-perforations.  He was continued on IV Zosyn to cover diverticulitis as well as any potential urinary tract pathogens.  His pain resolved and he was able to tolerate a regular diet and take his antibiotics by mouth prior to discharge.     Consults:   Consults (From admission, onward)        Status Ordering Provider     Inpatient consult to General Surgery  Once     Provider:  Jovanny Bains Jr., MD    Completed CAROLYN CASTRO            Final Active Diagnoses:    Diagnosis Date Noted POA    PRINCIPAL PROBLEM:  Diverticulitis of sigmoid colon [K57.32] 05/06/2019 Yes      Problems Resolved During this Admission:       Discharged Condition: stable    Disposition: Home or Self Care    Follow Up:  Follow-up Information     Abhishek.    Why:  Follow up in 1-2 weeks  Contact information:  [address]  [facility] [address]  [phone]                 Patient Instructions:      Diet Adult  Regular     Activity as tolerated       Significant Diagnostic Studies:   FINDINGS:  Images of the lower thorax are remarkable for bilateral dependent atelectasis.  There is a pulmonary nodule within the left lower lobe measuring 0.4 cm.    There is mild gynecomastia.  The liver is slightly hypoattenuating, possibly reflecting steatosis, correlation with LFTs recommended.  The spleen, pancreas, gallbladder and adrenal glands are grossly unremarkable.  The stomach is mildly distended with ingested content.  The pancreatic duct is not dilated.  No significant abdominal lymphadenopathy.    There is left and right nonobstructive nephrolithiasis.  No hydronephrosis.  The bilateral ureters are unremarkable without calculi seen.  The urinary bladder is decompressed.  The prostate is not enlarged.    There are a few scattered colonic diverticula.  There is inflammation and indistinctness about a short segment of mid sigmoid colon, in the region of diverticula most consistent with acute diverticulitis.  There are a few punctate foci of air along the medial margin of the affected bowel, concerning for small contained micro perforation.  No large volume free air or focal organized collection at this time.  There is strand-like fluid in the region.  Diverticula are noted elsewhere along the colon without additional regions of inflammation.  The appendix and terminal ileum are unremarkable.  The small bowel is grossly unremarkable.  No bulky pelvic lymphadenopathy.    No focal osseous destructive process.  No significant inguinal lymphadenopathy.      Impression     This report was flagged in Epic as abnormal.    1. Findings most consistent with acute sigmoid diverticulitis.  Punctate foci of air adjacent to the involve segment suggest sequela of micro perforation without focal organized fluid collection at this time.  Early abscess however is not excluded in this region.  Follow-up is advised.  Additionally, consider follow-up  colonoscopy to exclude underlying mass.  2. Bilateral nonobstructive nephrolithiasis without findings to suggest obstructive uropathy.  3. Possible hepatic steatosis, correlation with LFTs recommended.  4. 0.4 cm pulmonary nodule within the left lower lobe.  For a solid nodule <6 mm, Fleischner Society 2017 guidelines recommend no routine follow up for a low risk patient, or follow-up with non-contrast chest CT at 12 months in a high risk patient.  5. Additional findings above.    Electronically signed by: Kody Palmer MD  Date: 05/06/2019         Medications:  Reconciled Home Medications:      Medication List      START taking these medications    ciprofloxacin HCl 500 MG tablet  Commonly known as:  CIPRO  Take 1 tablet (500 mg total) by mouth every 12 (twelve) hours. for 7 days     metroNIDAZOLE 500 MG tablet  Commonly known as:  FLAGYL  Take 1 tablet (500 mg total) by mouth every 8 (eight) hours. for 7 days            Time spent on the discharge of patient:  <30 minutes  Patient was seen and examined on the date of discharge and determined to be suitable for discharge.         Luna Muniz MD  Department of Hospital Medicine  [facility]

## 2019-05-11 LAB
BACTERIA BLD CULT: NORMAL
BACTERIA BLD CULT: NORMAL

## 2019-10-25 NOTE — HOSPITAL COURSE
Patient was made NPO on admission and was evaluated by general surgery given the micro-perforations.  He was continued on IV Zosyn to cover diverticulitis as well as any potential urinary tract pathogens.  His pain resolved and he was able to tolerate a regular diet and take his antibiotics by mouth prior to discharge.  
Regular rate and rhythm, Heart sounds S1 S2 present, no murmurs, rubs or gallops

## 2020-05-01 ENCOUNTER — HOSPITAL ENCOUNTER (EMERGENCY)
Facility: HOSPITAL | Age: 35
Discharge: HOME | End: 2020-05-01
Attending: EMERGENCY MEDICINE

## 2020-05-01 VITALS
BODY MASS INDEX: 38.45 KG/M2 | WEIGHT: 245 LBS | SYSTOLIC BLOOD PRESSURE: 133 MMHG | HEART RATE: 84 BPM | TEMPERATURE: 98 F | HEIGHT: 67 IN | DIASTOLIC BLOOD PRESSURE: 86 MMHG | OXYGEN SATURATION: 99 % | RESPIRATION RATE: 16 BRPM

## 2020-05-01 DIAGNOSIS — S89.92XA LEFT KNEE INJURY, INITIAL ENCOUNTER: Primary | ICD-10-CM

## 2020-05-01 DIAGNOSIS — M25.562 CHRONIC PAIN OF LEFT KNEE: ICD-10-CM

## 2020-05-01 DIAGNOSIS — S83.512A RUPTURE OF ANTERIOR CRUCIATE LIGAMENT OF LEFT KNEE, INITIAL ENCOUNTER: ICD-10-CM

## 2020-05-01 DIAGNOSIS — G89.29 CHRONIC PAIN OF LEFT KNEE: ICD-10-CM

## 2020-05-01 PROCEDURE — 25000003 PHARM REV CODE 250: Performed by: EMERGENCY MEDICINE

## 2020-05-01 PROCEDURE — 99284 EMERGENCY DEPT VISIT MOD MDM: CPT | Mod: 25,,, | Performed by: EMERGENCY MEDICINE

## 2020-05-01 PROCEDURE — 20610 PR DRAIN/INJECT LARGE JOINT/BURSA: ICD-10-PCS | Mod: LT,,, | Performed by: EMERGENCY MEDICINE

## 2020-05-01 PROCEDURE — 99284 EMERGENCY DEPT VISIT MOD MDM: CPT | Mod: 25

## 2020-05-01 PROCEDURE — 63600175 PHARM REV CODE 636 W HCPCS: Performed by: EMERGENCY MEDICINE

## 2020-05-01 PROCEDURE — 20610 DRAIN/INJ JOINT/BURSA W/O US: CPT | Mod: LT,,, | Performed by: EMERGENCY MEDICINE

## 2020-05-01 PROCEDURE — 99284 PR EMERGENCY DEPT VISIT,LEVEL IV: ICD-10-PCS | Mod: 25,,, | Performed by: EMERGENCY MEDICINE

## 2020-05-01 PROCEDURE — 20610 DRAIN/INJ JOINT/BURSA W/O US: CPT | Mod: LT

## 2020-05-01 PROCEDURE — 96372 THER/PROPH/DIAG INJ SC/IM: CPT | Mod: 59

## 2020-05-01 RX ORDER — KETOROLAC TROMETHAMINE 30 MG/ML
30 INJECTION, SOLUTION INTRAMUSCULAR; INTRAVENOUS
Status: COMPLETED | OUTPATIENT
Start: 2020-05-01 | End: 2020-05-01

## 2020-05-01 RX ORDER — NAPROXEN 500 MG/1
500 TABLET ORAL 2 TIMES DAILY WITH MEALS
Qty: 14 TABLET | Refills: 0 | Status: SHIPPED | OUTPATIENT
Start: 2020-05-01 | End: 2020-05-08

## 2020-05-01 RX ORDER — NAPROXEN 500 MG/1
500 TABLET ORAL 2 TIMES DAILY WITH MEALS
Qty: 14 TABLET | Refills: 0 | Status: SHIPPED | OUTPATIENT
Start: 2020-05-01 | End: 2020-05-01 | Stop reason: SDUPTHER

## 2020-05-01 RX ORDER — LIDOCAINE HYDROCHLORIDE 10 MG/ML
10 INJECTION, SOLUTION EPIDURAL; INFILTRATION; INTRACAUDAL; PERINEURAL
Status: COMPLETED | OUTPATIENT
Start: 2020-05-01 | End: 2020-05-01

## 2020-05-01 RX ADMIN — LIDOCAINE HYDROCHLORIDE 100 MG: 10 INJECTION, SOLUTION EPIDURAL; INFILTRATION; INTRACAUDAL; PERINEURAL at 09:05

## 2020-05-01 RX ADMIN — KETOROLAC TROMETHAMINE 30 MG: 30 INJECTION, SOLUTION INTRAMUSCULAR at 09:05

## 2020-05-02 NOTE — DISCHARGE INSTRUCTIONS
Please follow-up with [facility]. If you would like to follow up with the [facility] for further care of your injury please call the [facility] Scheduling Department at [phone] during business hours.  Please let the  know you need a follow-up appointment with Orthopedics, and you will be scheduled in the Orthopedic Clinic.  Please bring your original Emergency Department discharge papers with  you to the clinic appointment.

## 2020-05-02 NOTE — ED PROVIDER NOTES
Encounter Date: 5/1/2020       History     Chief Complaint   Patient presents with    Knee Pain     Pt c/o of left knee pain with swelling. Has old injury to the knee but no new trauma. Pain 8/10 on the pain scale. No covid symptoms.     HPI   35-year-old male with no significant medical history presenting with left knee pain and swelling.  Patient states he has an old injury to the left knee but no new trauma.  Pain is rated 8/10 associated with swelling of the left knee.  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  Denies any falls or trauma.  Denies any fevers, chills, nausea, vomiting, chest pain, abdominal pain or any other symptoms.  Denies any calf or lower leg pain.  Denies any thigh pain.  There are no bony deformities.  There is no laceration or open fracture.  Onset has been gradual.    Review of patient's allergies indicates:  No Known Allergies  History reviewed. No pertinent past medical history.  History reviewed. No pertinent surgical history.  History reviewed. No pertinent family history.  Social History     Tobacco Use    Smoking status: Former Smoker    Smokeless tobacco: Never Used   Substance Use Topics    Alcohol use: Yes    Drug use: Never     Review of Systems   Constitutional: Negative for chills, fatigue and fever.   HENT: Negative for congestion, facial swelling and nosebleeds.    Eyes: Negative for photophobia and visual disturbance.   Respiratory: Negative for cough, chest tightness and shortness of breath.    Cardiovascular: Positive for leg swelling. Negative for chest pain and palpitations.   Gastrointestinal: Negative for abdominal distention, abdominal pain, nausea and vomiting.   Endocrine: Negative for polyphagia and polyuria.   Genitourinary: Negative for dysuria, flank pain, frequency and hematuria.   Musculoskeletal: Positive for arthralgias, gait problem and joint swelling. Negative for back pain,  myalgias and neck pain.   Skin: Negative for color change and wound.   Neurological: Negative for dizziness, facial asymmetry, weakness and numbness.   Hematological: Negative for adenopathy. Does not bruise/bleed easily.   Psychiatric/Behavioral: Negative for agitation and confusion.       Physical Exam     Initial Vitals [20]   BP Pulse Resp Temp SpO2   133/86 84 16 98.1 °F (36.7 °C) 99 %      MAP       --         Physical Exam    Nursing note and vitals reviewed.    Gen/Constitutional: Interactive. No acute distress  Head: Normocephalic, Atraumatic  Neck: supple, no masses or LAD  Eyes: PERRLA, conjunctiva clear  Ears, Nose and Throat: No rhinorrhea or stridor.  Cardiac: Reg Rhythm, No murmur  Pulmonary: CTA Bilat, no wheezes, rhonchi, rales.  GI: Abdomen soft, non-tender, non-distended; no rebound or guarding  : No CVA tenderness.  Musculoskeletal: Extremities warm, well perfused, no erythema, no edema  Knee Exam:  Left Knee:  On inspection (no redness, positive swelling). No warmth.  ROM limited in flexion.  Ligament exam normal - positive laxity w lachman, varus or valgus stress or posterior drawer testing.  Positive tenderness over Medial and lateral joint line  Positive pain w full flexion and negative click w Irving  Tender over medial knee.  Skin: No rashes  Neuro: Alert and Oriented x 3; No focal motor or sensory deficits.    Psych: Normal affect      ED Course   Arthrocentesis  Date/Time: 2020 10:06 PM  Performed by: Reji Barraza MD  Authorized by: Valente Bustamante DO   Consent Done: Yes  Consent: Verbal consent obtained.  Risks and benefits: risks, benefits and alternatives were discussed  Consent given by: patient  Patient understanding: patient states understanding of the procedure being performed  Patient identity confirmed: , MRN, provided demographic data, verbally with patient and name  Indications: joint swelling   Body area: knee  Joint: left knee  Local anesthesia used:  yes    Anesthesia:  Local anesthesia used: yes  Local Anesthetic: lidocaine 1% without epinephrine  Anesthetic total: 5 mL  Preparation: Patient was prepped and draped in the usual sterile fashion.  Needle size: 18 G  Approach: medial  Aspirate amount: 120 mL  Aspirate: bloody  Patient tolerance: Patient tolerated the procedure well with no immediate complications  Complications: No        Labs Reviewed - No data to display       Imaging Results          X-Ray Knee 1 or 2 View Left (Final result)  Result time 05/01/20 22:05:27   Procedure changed from X-Ray Knee 1 or 2 View Right     Final result by Roberto Tobin MD (05/01/20 22:05:27)                 Impression:      Suprapatellar joint effusion and soft tissue edema.  No acute bony abnormality.      Electronically signed by: Roberto Tobin MD  Date:    05/01/2020  Time:    22:05             Narrative:    EXAMINATION:  XR KNEE 1 OR 2 VIEW LEFT    CLINICAL HISTORY:  knee swelling;    TECHNIQUE:  Two views of the left knee.    COMPARISON:  None.    FINDINGS:  No evidence of acute fracture or dislocation.  Joint spaces are well maintained.  There is a large suprapatellar joint effusion and soft tissue edema.  No radiopaque foreign body.                                 Medical Decision Making:   History:   Old Medical Records: I decided to obtain old medical records.  Old Records Summarized: records from clinic visits.  Initial Assessment:   35-year-old male with no significant medical history presenting with left knee pain and swelling.  Differential Diagnosis:   Differential diagnosis includes but not limited to:  Ligamentous injury, fracture, dislocation, effusion, septic arthritis, gout, pseudogout, cellulitis, abscess.  Independently Interpreted Test(s):   I have ordered and independently interpreted X-rays - see prior notes.  Clinical Tests:   Radiological Study: Ordered and Reviewed    Urgent evaluation of patient presenting with left knee swelling in the  setting of re-injuring an old injury of ACL.  He is afebrile, vital signs are stable.  Physical exam findings remarkable for swelling around the left knee near the patellar and prepatellar space.  He is able to flex and extend however with significant pain.  No fevers, chills or any infectious symptoms to suspect septic arthritis.  Patient has a history of ACL injury in the past and was planning operative repair and re-injured it while at work this week.  On evaluation he has joint effusion on the medial and lateral prepatellar space.  X-ray obtained with no evidence of fracture or dislocation.  Arthrocentesis of the prepatellar space completed on the medial and lateral aspect with removal of 120 mL of bloody fluid.  Suspect likely hemarthrosis from his injury.  Patient was given IM Toradol, and given prescription for naproxen for inflammation.  Will place in knee immobilizer/brace with crutches and have patient follow-up in the next 3-5 days for orthopedic follow-up once swelling has improved to discuss repair.  Suspect partial injury to ACL as patient is able to bear weight but with pain. Patient agreeable to discharge plan. Strict ED precautions and return instructions discussed at length and patient verbalized understanding. All questions were answered and ample time was given for questions.      Complexity: High -                                  Clinical Impression:       ICD-10-CM ICD-9-CM   1. Left knee injury, initial encounter S89.92XA 959.7   2. Chronic pain of left knee M25.562 719.46    G89.29 338.29   3. Rupture of anterior cruciate ligament of left knee, initial encounter S83.512A 844.2         Disposition:   Disposition: Discharged  Condition: Stable     ED Disposition Condition    Discharge Stable        ED Prescriptions     Medication Sig Dispense Start Date End Date Auth. Provider    naproxen (NAPROSYN) 500 MG tablet  (Status: Discontinued) Take 1 tablet (500 mg total) by mouth 2 (two) times daily  with meals. for 7 days 14 tablet 5/1/2020 5/1/2020 Valente Bustamante DO    naproxen (NAPROSYN) 500 MG tablet Take 1 tablet (500 mg total) by mouth 2 (two) times daily with meals. for 7 days 14 tablet 5/1/2020 5/8/2020 Valente Bustamante DO        Follow-up Information     Follow up With Specialties Details Why Contact Info Additional Information    [facility] Surgical Oncology, Orthopedic Surgery, Genetics, Physical Medicine and Rehabilitation, Occupational Therapy, Radiology Schedule an appointment as soon as possible for a visit in 1 week For follow-up [address] [facility] [address]  [phone]       [facility] - Orthopedics Orthopedics Schedule an appointment as soon as possible for a visit in 1 week As needed [id number] [facility], 5th Floor  [facility] [id number] [phone] [address] - 5th Floor                      Valente Bustamante DO, FAAEM  Emergency Staff Physician   Dept of Emergency Medicine   [facility]  Spectralink: [phone]                 Valente Bustamante DO  05/02/20 1422

## 2020-05-02 NOTE — ED TRIAGE NOTES
Morgan Ulloa, an 35 y.o. male presents to the ED c/o sudden L knee pain and swelling when he woke up today. Pt reports he had a torn ligament before at the same L knee and removed some fluids from his knee. Denies taking pain medicine. Denies fever    Chief Complaint   Patient presents with    Knee Pain     Pt c/o of left knee pain with swelling. Has old injury to the knee but no new trauma. Pain 8/10 on the pain scale. No covid symptoms.     Review of patient's allergies indicates:  No Known Allergies  History reviewed. No pertinent past medical history.        LOC: The patient is awake, alert, aware of environment with an appropriate affect. Oriented x4, speaking appropriately  APPEARANCE: Pt resting comfortably, in no acute distress, pt is clean and well groomed, clothing properly fastened  SKIN:The skin is warm and dry, color consistent with ethnicity, patient has normal skin turgor and moist mucus membranes  RESPIRATORY:Airway is open and patent, respirations are spontaneous, patient has a normal effort and rate, no accessory muscle use noted.  CARDIAC: Normal rate and rhythm, no peripheral edema noted, capillary refill < 3 seconds, bilateral radial pulses 2+.  ABDOMEN: Soft, non tender, non distended.   NEUROLOGIC: PERRLA, facial expression is symmetrical, patient moving all extremities spontaneously, normal sensation in all extremities when touched with a finger.  Follows all commands appropriately  MUSCULOSKELETAL: Patient moving all extremities spontaneously, L knee swelling, pain and decreased ROM d/t pain

## 2020-05-03 ENCOUNTER — HOSPITAL ENCOUNTER (EMERGENCY)
Facility: HOSPITAL | Age: 35
Discharge: HOME | End: 2020-05-04
Attending: EMERGENCY MEDICINE

## 2020-05-03 DIAGNOSIS — M25.462 KNEE EFFUSION, LEFT: Primary | ICD-10-CM

## 2020-05-03 PROCEDURE — 99284 PR EMERGENCY DEPT VISIT,LEVEL IV: ICD-10-PCS | Mod: 25,,, | Performed by: EMERGENCY MEDICINE

## 2020-05-03 PROCEDURE — 20610 DRAIN/INJ JOINT/BURSA W/O US: CPT

## 2020-05-03 PROCEDURE — 20610 DRAIN/INJ JOINT/BURSA W/O US: CPT | Mod: ,,, | Performed by: EMERGENCY MEDICINE

## 2020-05-03 PROCEDURE — 99284 EMERGENCY DEPT VISIT MOD MDM: CPT | Mod: 25

## 2020-05-03 PROCEDURE — 99284 EMERGENCY DEPT VISIT MOD MDM: CPT | Mod: 25,,, | Performed by: EMERGENCY MEDICINE

## 2020-05-03 PROCEDURE — 20610 PR DRAIN/INJECT LARGE JOINT/BURSA: ICD-10-PCS | Mod: ,,, | Performed by: EMERGENCY MEDICINE

## 2020-05-03 RX ORDER — LIDOCAINE HYDROCHLORIDE 10 MG/ML
10 INJECTION, SOLUTION EPIDURAL; INFILTRATION; INTRACAUDAL; PERINEURAL
Status: COMPLETED | OUTPATIENT
Start: 2020-05-04 | End: 2020-05-04

## 2020-05-03 RX ORDER — ACETAMINOPHEN 500 MG
1000 TABLET ORAL
Status: COMPLETED | OUTPATIENT
Start: 2020-05-04 | End: 2020-05-04

## 2020-05-03 RX ORDER — OXYCODONE HYDROCHLORIDE 5 MG/1
5 TABLET ORAL
Status: COMPLETED | OUTPATIENT
Start: 2020-05-04 | End: 2020-05-04

## 2020-05-04 VITALS
DIASTOLIC BLOOD PRESSURE: 78 MMHG | SYSTOLIC BLOOD PRESSURE: 145 MMHG | OXYGEN SATURATION: 99 % | WEIGHT: 245 LBS | HEIGHT: 67 IN | TEMPERATURE: 98 F | HEART RATE: 78 BPM | BODY MASS INDEX: 38.45 KG/M2 | RESPIRATION RATE: 16 BRPM

## 2020-05-04 LAB
APPEARANCE FLD: NORMAL
BODY FLD TYPE: NORMAL
BODY FLD TYPE: NORMAL
COLOR FLD: NORMAL
CRYSTALS FLD MICRO: NEGATIVE
EOSINOPHIL NFR FLD MANUAL: 3 %
GRAM STN SPEC: NORMAL
GRAM STN SPEC: NORMAL
LYMPHOCYTES NFR FLD MANUAL: 34 %
MONOS+MACROS NFR FLD MANUAL: 27 %
NEUTROPHILS NFR FLD MANUAL: 36 %
PATH INTERP FLD-IMP: NORMAL
WBC # FLD: 3889 /CU MM

## 2020-05-04 PROCEDURE — 87070 CULTURE OTHR SPECIMN AEROBIC: CPT

## 2020-05-04 PROCEDURE — 89051 BODY FLUID CELL COUNT: CPT

## 2020-05-04 PROCEDURE — 87205 SMEAR GRAM STAIN: CPT

## 2020-05-04 PROCEDURE — 25000003 PHARM REV CODE 250: Performed by: EMERGENCY MEDICINE

## 2020-05-04 PROCEDURE — 89060 EXAM SYNOVIAL FLUID CRYSTALS: CPT

## 2020-05-04 RX ORDER — LIDOCAINE HYDROCHLORIDE 10 MG/ML
5 INJECTION, SOLUTION EPIDURAL; INFILTRATION; INTRACAUDAL; PERINEURAL
Status: COMPLETED | OUTPATIENT
Start: 2020-05-04 | End: 2020-05-04

## 2020-05-04 RX ORDER — ACETAMINOPHEN 500 MG
1000 TABLET ORAL EVERY 6 HOURS PRN
Qty: 30 TABLET | Refills: 0 | OUTPATIENT
Start: 2020-05-04 | End: 2022-01-14

## 2020-05-04 RX ORDER — OXYCODONE HYDROCHLORIDE 5 MG/1
5 TABLET ORAL EVERY 4 HOURS PRN
Qty: 6 TABLET | Refills: 0 | Status: SHIPPED | OUTPATIENT
Start: 2020-05-04

## 2020-05-04 RX ADMIN — OXYCODONE HYDROCHLORIDE 5 MG: 5 TABLET ORAL at 12:05

## 2020-05-04 RX ADMIN — ACETAMINOPHEN 1000 MG: 500 TABLET ORAL at 12:05

## 2020-05-04 RX ADMIN — LIDOCAINE HYDROCHLORIDE 50 MG: 10 INJECTION, SOLUTION EPIDURAL; INFILTRATION; INTRACAUDAL; PERINEURAL at 12:05

## 2020-05-04 NOTE — DISCHARGE INSTRUCTIONS
El líquido en la rodilla no parece infectado.     Es probable que se deba a ajith nueva lesión del LCA que causa inflamación y sangrado.      Siga tomando reid analgésico en casa, le recetó un nuevo medicamento para el dolor irruptivo.      Fernando un seguimiento con los médicos ortopédicos dentro de 1-2 semanas para ajith evaluación continua. Es posible que necesite ajith resonancia magnética para determinar esta naturaleza de la lesión.      Tiene dolor incontrolado, enrojecimiento en la rodilla, fiebre o cualquier otro síntoma nuevo, que empeora o preocupa, por favor regrese al departamento de emergencias para ajith nueva evaluación.    Si desea hacer un seguimiento con la [facility] para obtener más atención de reid fractura, llame al Departamento de Programación del [facility] al [phone] ashli el horario comercial. Informe al planificador que necesita ajith eder de seguimiento de fracturas con [facility], y se lo programará en la Clínica Ortop[facility]ica. Por favor traiga osmin documentos originales de cynthia del Departamento de Emergencia a la eder clínica.    The liquid in your knee does not seem infected.     it is likely from a ACL re-injury causing inflammation and bleeding.      Please  keep taking your pain medication at home your prescribed a new pain medication for breakthrough pain.      Please follow-up with orthopedic doctors within 1-2 weeks for continued evaluation.  You may need a MRI to determine this nature of the injury.      You are having uncontrolled pain, redness developing in her knee, fevers, or any other new, worsening or worrisome symptoms please return to the emergency department for re-evaluation.      If you would like to follow up with the [facility] for further care of your fracture, please call the [facility] Scheduling Department at [phone] during business hours.  Please let the  know you need a fracture follow-up  appointment with Orthopedics, and you will be scheduled in the Orthopedic Clinic.  Please bring your original Emergency Department discharge papers with  you to the clinic appointment.

## 2020-05-04 NOTE — ED PROVIDER NOTES
Encounter Date: 5/3/2020       History     Chief Complaint   Patient presents with    Knee Pain     Pt c/o left knee pain x 3 days. Was seen Friday night in the ER. Pain has continued.      HPI   35-year-old male with past medical history of left ACL repair coming in with continued left knee knee pain for 3 days.  Patient was seen 3 days ago here had an x-ray positive for joint effusion with no fractures was tapped with 120 mL of bloody fluid taken out.  Diagnosis was likely hemarthrosis from re-injury of ACL.  Patient has been taking naproxen with no significant relief of pain in his coming out because he has feels fluid has reaccumulated.  No fevers, no trauma, family states that he has a follow-up in 2 weeks with Orthopedics  Pain is moderate.  Exacerbated by any movements.  Other joint pain or swelling.  Otherwise systemically well with no other complaints.  Review of patient's allergies indicates:  No Known Allergies  History reviewed. No pertinent past medical history.  History reviewed. No pertinent surgical history.  History reviewed. No pertinent family history.  Social History     Tobacco Use    Smoking status: Former Smoker    Smokeless tobacco: Never Used   Substance Use Topics    Alcohol use: Yes    Drug use: Never     Review of Systems    Constitutional:  No Fever  Eyes: No Vision Changes  ENT/Mouth: No sore throat  Cardiovascular:  No Chest Pain  Respiratory:  Delete No SOB  Gastrointestinal:   No abdo pain.  Genitourinary:  No  pain, No dysuria   Musculoskeletal:  Positive left knee pain, No Back Pain, No Neck Pain, No recent trauma.  Skin:  No skin Lesions  Neuro:  No Weakness, No Numbness, No Paresthesias, No Headache      Physical Exam     Initial Vitals [05/03/20 2333]   BP Pulse Resp Temp SpO2   139/72 70 16 98 °F (36.7 °C) 98 %      MAP       --         Physical Exam  Physical Exam:  CONSTITUTIONAL: Well developed, well nourished, in no acute distress, calm  HENT: Normocephalic,  "atraumatic    EYES: Sclerae anicteric   NECK: Supple  RESPIRATORY: Breathing comfortably. Speaking in full sentences   NEUROLOGIC: Alert, interacting normally. No facial droop.   MSK:  Left knee is swollen and mildly warm with no erythema.  Able to fully extend and flex to 100° prior to having pain.  Moving all four extremities.  Skin: Warm and dry. No visible rash on exposed areas of skin.    Psych: Mood and affect normal.       ED Course   Arthrocentesis  Date/Time: 5/4/2020 12:05 AM  Performed by: Arcenio Huang MD  Authorized by: Arcenio Huang MD   Consent Done: Yes  Consent: Written consent obtained.  Consent given by: patient  Patient understanding: patient states understanding of the procedure being performed  Patient consent: the patient's understanding of the procedure matches consent given  Time out: Immediately prior to procedure a "time out" was called to verify the correct patient, procedure, equipment, support staff and site/side marked as required.  Indications: joint swelling   Body area: knee  Joint: left knee  Local anesthesia used: yes    Anesthesia:  Local anesthesia used: yes  Local Anesthetic: lidocaine 2% without epinephrine  Patient sedated: no  Preparation: Patient was prepped and draped in the usual sterile fashion.  Approach: lateral (lateral and medial)  Aspirate amount: 100 mL  Aspirate: bloody  Patient tolerance: Patient tolerated the procedure well with no immediate complications  Complications: No  Specimens: Yes (sinovial fluid)  Implants: No        Labs Reviewed - No data to display       Imaging Results    None          Medical Decision Making:   History:   Old Medical Records: I decided to obtain old medical records.    35-year-old male with past medical history as noted coming in with recurrent left knee effusion.  No new trauma, no fevers, no redness.  Previous chart review shows x-ray as noted above knee was tapped but fluid was not sent for analysis.    Discussed with " patient possibility of repeat tap for symptomatic control.  Also discussed that heme arthrosis is likely to reaccumulate again after tabs today.  Patient would like to get it tapped for symptomatic relief.  Will give Tylenol and a dose of oxycodone in the ED for symptomatic control.  Given reason x-ray no benefit in repeating.  Will send fluid for analysis today.    If no signs of septic arthritis on analysis (not likely to be septic arthritis clinically) anticipate discharge home with continued pain control and orthopedic follow-up.    Update:  Synovial analysis negative for signs of infection, no crystals.  Pt improved with meds and aspiration.  Educated on possible new ligamentous patholgy causing bleeding into knee. Explained effusion may re-accumulate.   Has ortho follow up  NSAID, tylenol and oxy (very short course) for breakthrough pain. WBAT with crutches for comfort.  ED return precautions.     Findings of ED work up explained to patient. Patient agrees with discharge plan and verbalizes understanding of return precautions.     MDM Complexity Points:   Problem Points:  1.New problem, with additional ED work-up planned -    Data Points:  Review or order clinical lab tests, Decision to obtain old records (in the EHR) and Review and summarization of old records, rads imaging form yesterday reviewed.                                        Clinical Impression:       ICD-10-CM ICD-9-CM   1. Knee effusion, left M25.462 719.06                                Arcenio Huang MD  05/04/20 2715

## 2020-05-04 NOTE — ED TRIAGE NOTES
Pt seen Friday for left knee pain and swelling. Pt knee was drained. Pt is back for pain and swelling in the left knee. Pain has not been relieved with naproxen 500mg.

## 2020-05-08 LAB — BACTERIA FLD CULT: NORMAL

## 2021-05-22 ENCOUNTER — IMMUNIZATION (OUTPATIENT)
Dept: INTERNAL MEDICINE | Facility: CLINIC | Age: 36
End: 2021-05-22
Payer: OTHER GOVERNMENT

## 2021-05-22 DIAGNOSIS — Z23 NEED FOR VACCINATION: Primary | ICD-10-CM

## 2021-05-22 PROCEDURE — 0001A COVID-19, MRNA, LNP-S, PF, 30 MCG/0.3 ML DOSE VACCINE: ICD-10-PCS | Mod: CV19,,, | Performed by: INTERNAL MEDICINE

## 2021-05-22 PROCEDURE — 0001A COVID-19, MRNA, LNP-S, PF, 30 MCG/0.3 ML DOSE VACCINE: CPT | Mod: CV19,,, | Performed by: INTERNAL MEDICINE

## 2021-05-22 PROCEDURE — 91300 COVID-19, MRNA, LNP-S, PF, 30 MCG/0.3 ML DOSE VACCINE: CPT | Mod: ,,, | Performed by: INTERNAL MEDICINE

## 2021-05-22 PROCEDURE — 91300 COVID-19, MRNA, LNP-S, PF, 30 MCG/0.3 ML DOSE VACCINE: ICD-10-PCS | Mod: ,,, | Performed by: INTERNAL MEDICINE

## 2021-06-13 ENCOUNTER — HOSPITAL ENCOUNTER (EMERGENCY)
Facility: OTHER | Age: 36
Discharge: HOME | End: 2021-06-13
Attending: EMERGENCY MEDICINE

## 2021-06-13 VITALS
BODY MASS INDEX: 35.84 KG/M2 | HEIGHT: 69 IN | TEMPERATURE: 97 F | WEIGHT: 242 LBS | RESPIRATION RATE: 16 BRPM | DIASTOLIC BLOOD PRESSURE: 83 MMHG | HEART RATE: 73 BPM | OXYGEN SATURATION: 97 % | SYSTOLIC BLOOD PRESSURE: 140 MMHG

## 2021-06-13 DIAGNOSIS — J02.9 VIRAL PHARYNGITIS: Primary | ICD-10-CM

## 2021-06-13 PROCEDURE — 99284 EMERGENCY DEPT VISIT MOD MDM: CPT | Mod: 25

## 2021-06-13 PROCEDURE — 96372 THER/PROPH/DIAG INJ SC/IM: CPT

## 2021-06-13 PROCEDURE — 63600175 PHARM REV CODE 636 W HCPCS: Performed by: EMERGENCY MEDICINE

## 2021-06-13 RX ORDER — DEXAMETHASONE SODIUM PHOSPHATE 4 MG/ML
8 INJECTION, SOLUTION INTRA-ARTICULAR; INTRALESIONAL; INTRAMUSCULAR; INTRAVENOUS; SOFT TISSUE
Status: COMPLETED | OUTPATIENT
Start: 2021-06-13 | End: 2021-06-13

## 2021-06-13 RX ORDER — IBUPROFEN 800 MG/1
800 TABLET ORAL EVERY 6 HOURS PRN
Qty: 30 TABLET | Refills: 0 | OUTPATIENT
Start: 2021-06-13 | End: 2022-01-14

## 2021-06-13 RX ADMIN — DEXAMETHASONE SODIUM PHOSPHATE 8 MG: 4 INJECTION INTRA-ARTICULAR; INTRALESIONAL; INTRAMUSCULAR; INTRAVENOUS; SOFT TISSUE at 03:06

## 2021-06-14 ENCOUNTER — IMMUNIZATION (OUTPATIENT)
Dept: INTERNAL MEDICINE | Facility: CLINIC | Age: 36
End: 2021-06-14
Payer: OTHER GOVERNMENT

## 2021-06-14 DIAGNOSIS — Z23 NEED FOR VACCINATION: Primary | ICD-10-CM

## 2021-06-14 PROCEDURE — 0002A COVID-19, MRNA, LNP-S, PF, 30 MCG/0.3 ML DOSE VACCINE: ICD-10-PCS | Mod: CV19,,, | Performed by: INTERNAL MEDICINE

## 2021-06-14 PROCEDURE — 0002A COVID-19, MRNA, LNP-S, PF, 30 MCG/0.3 ML DOSE VACCINE: CPT | Mod: CV19,,, | Performed by: INTERNAL MEDICINE

## 2021-06-14 PROCEDURE — 91300 COVID-19, MRNA, LNP-S, PF, 30 MCG/0.3 ML DOSE VACCINE: CPT | Mod: ,,, | Performed by: INTERNAL MEDICINE

## 2021-06-14 PROCEDURE — 91300 COVID-19, MRNA, LNP-S, PF, 30 MCG/0.3 ML DOSE VACCINE: ICD-10-PCS | Mod: ,,, | Performed by: INTERNAL MEDICINE

## 2022-01-08 ENCOUNTER — IMMUNIZATION (OUTPATIENT)
Dept: PRIMARY CARE CLINIC | Facility: CLINIC | Age: 37
End: 2022-01-08
Payer: OTHER GOVERNMENT

## 2022-01-08 DIAGNOSIS — Z23 NEED FOR VACCINATION: Primary | ICD-10-CM

## 2022-01-08 PROCEDURE — 91300 COVID-19, MRNA, LNP-S, PF, 30 MCG/0.3 ML DOSE VACCINE: CPT | Mod: PBBFAC | Performed by: INTERNAL MEDICINE

## 2022-01-08 PROCEDURE — 0004A COVID-19, MRNA, LNP-S, PF, 30 MCG/0.3 ML DOSE VACCINE: CPT | Mod: CV19,PBBFAC | Performed by: INTERNAL MEDICINE

## 2022-01-14 ENCOUNTER — HOSPITAL ENCOUNTER (EMERGENCY)
Facility: OTHER | Age: 37
Discharge: HOME | End: 2022-01-14
Attending: EMERGENCY MEDICINE

## 2022-01-14 VITALS
SYSTOLIC BLOOD PRESSURE: 154 MMHG | OXYGEN SATURATION: 97 % | RESPIRATION RATE: 16 BRPM | TEMPERATURE: 98 F | DIASTOLIC BLOOD PRESSURE: 74 MMHG | HEIGHT: 68 IN | HEART RATE: 82 BPM | WEIGHT: 237 LBS | BODY MASS INDEX: 35.92 KG/M2

## 2022-01-14 DIAGNOSIS — U07.1 COVID-19 VIRUS DETECTED: ICD-10-CM

## 2022-01-14 DIAGNOSIS — U07.1 LAB TEST POSITIVE FOR DETECTION OF COVID-19 VIRUS: Primary | ICD-10-CM

## 2022-01-14 DIAGNOSIS — J02.9 SORE THROAT: ICD-10-CM

## 2022-01-14 LAB
CTP QC/QA: YES
SARS-COV-2 RDRP RESP QL NAA+PROBE: POSITIVE

## 2022-01-14 PROCEDURE — 99284 EMERGENCY DEPT VISIT MOD MDM: CPT | Mod: 25

## 2022-01-14 PROCEDURE — U0002 COVID-19 LAB TEST NON-CDC: HCPCS | Performed by: NURSE PRACTITIONER

## 2022-01-14 PROCEDURE — 25000003 PHARM REV CODE 250: Performed by: NURSE PRACTITIONER

## 2022-01-14 PROCEDURE — 63600175 PHARM REV CODE 636 W HCPCS: Performed by: NURSE PRACTITIONER

## 2022-01-14 PROCEDURE — 96372 THER/PROPH/DIAG INJ SC/IM: CPT

## 2022-01-14 RX ORDER — KETOROLAC TROMETHAMINE 30 MG/ML
10 INJECTION, SOLUTION INTRAMUSCULAR; INTRAVENOUS
Status: COMPLETED | OUTPATIENT
Start: 2022-01-14 | End: 2022-01-14

## 2022-01-14 RX ORDER — IBUPROFEN 600 MG/1
600 TABLET ORAL EVERY 6 HOURS PRN
Qty: 20 TABLET | Refills: 0 | Status: SHIPPED | OUTPATIENT
Start: 2022-01-14

## 2022-01-14 RX ORDER — ACETAMINOPHEN 500 MG
1000 TABLET ORAL EVERY 6 HOURS PRN
Qty: 20 TABLET | Refills: 0 | Status: SHIPPED | OUTPATIENT
Start: 2022-01-14

## 2022-01-14 RX ADMIN — KETOROLAC TROMETHAMINE 10 MG: 30 INJECTION, SOLUTION INTRAMUSCULAR; INTRAVENOUS at 04:01

## 2022-01-14 RX ADMIN — LIDOCAINE HYDROCHLORIDE 15 ML: 20 SOLUTION ORAL; TOPICAL at 04:01

## 2022-01-14 NOTE — ED TRIAGE NOTES
Chief Complaint   Patient presents with    Sore Throat     Pt c.o sore throat onset 3 days ago. Pt took 2 home covid test negative.  AAO x 3 NADN skin w.d. pt is English speaking. Pt daughter with pt that speaks english     36 year male here today for sore throat. Reports sore throat x 3 days. States he took two home COVID test and they were both negative. He denies fever, loss of taste/smell, headache, shortness of breath, and cough. He only reports sore throat. States he took Aleve with no relief.

## 2022-01-14 NOTE — ED PROVIDER NOTES
CHIEF COMPLAINT:   Chief Complaint   Patient presents with    Sore Throat     Pt c.o sore throat onset 3 days ago. Pt took 2 home covid test negative.  AAO x 3 NADN skin w.d. pt is [place] speaking. Pt daughter with pt that speaks english       HISTORY OF PRESENT ILLNESS: Morgan Ulloa who is a 36 y.o. presents to the emergency department today with complaint of sore throat for the past 3 days.  He is taken to at home COVID test that were both negative.  He he has been taking Aleve and Tylenol for the sore throat with no improvement which prompted him to present to the ED.  He denies fever, chills, chest pain shortness of breath.    REVIEW OF SYSTEMS:  Constitutional: - fever, -chills -fatigue -malaise  Eyes: No discharge. No pain.  HENT: -nasal congestion, -anosmia; +sore throat   Cardiovascular:  No chest pain, no palpitations.  Respiratory: -cough, -shortness of breath.  Gastrointestinal: no nausea, no diarrhea, No abdominal pain, no vomiting.   Genitourinary: No hematuria, dysuria, urgency.  Musculoskeletal: No back pain. -myalgias  Skin: No rashes, no lesions.  Neurological: -headache, no focal weakness.    Otherwise remaining ROS negative     ALLERGIES REVIEWED  MEDICATIONS REVIEWED  PMH/PSH/SOC/FH REVIEWED     The history is provided by the patient.    Nursing/Ancillary staff note reviewed.        PHYSICAL EXAM:  VS reviewed  Vitals:    01/14/22 1634   BP: (!) 154/74   Pulse: 82   Resp: 16   Temp: 98.2 °F (36.8 °C)       General Appearance: The patient is alert, has no immediate or signs of toxicity. No acute distress.    HEENT: Eyes: Pupils equal; Extra ocular movements intact. No drainage.  Oropharynx with cobblestoning, no erythema, tonsillar edema or exudate, no posterior oropharynx elevation, uvula midline, no trismus  Neck: Neck is supple non-tender. No lymphadenopathy. No stridor.   Respiratory: There are no retractions, lungs are clear to auscultation. No wheezing, no crackles. Chest wall nontender to  palpation.   Cardiovascular: Regular rate and rhythm. No murmurs, rubs or gallops.  Gastrointestinal:  Abdomen is soft and non-tender, No guarding, no rebound.  No pulsatile mass.   Neurological: Alert and oriented x 4. No focal weakness. Strength intact 5/5 bilaterally in upper and lower extremities.   Skin: Warm and dry, no rashes.  Musculoskeletal: Extremities are non-tender, non-swollen and have full range of motion.      No past medical history on file.      No past surgical history on file.      ED COURSE:  ED Course as of 01/14/22 1700   Fri Jan 14, 2022   1548 SARS-CoV-2 RNA, Amplification, Qual(!): Positive [CU]      ED Course User Index  [CU] Piedad Liz, NP       Imaging Results    None       Patient presenting with general illness symptoms; appears well and nontoxic. Exam grossly unremarkable at this time.    DIFFERENTIAL DIAGNOSIS: After history and physical exam a differential diagnosis was considered, but was not limited to,   Sepsis, meningitis, otitis media/external, nasal polyp, bacterial sinusitis, allergic rhinitis, influenza, COVID19, bacterial/viral pharyngitis, bacterial/viral pneumonia.    ED management: Patient seen for a viral-like illness, therefore due to the most recent recommendations from our hospital administrations/infectious disease at this time, the patient will be swabbed for COVID 19. Back of his throat was swabbed as some studies show that Omicron is colonizing in the back of the throat and rapid COVID is positive. Ambulatory trial reveals no oxygen desaturation. Instructed patient on symptomatic treatment and increase oral hydration.  Discharged home in good condition with supportive medications, work note and quarantine instructions.  Vital signs did not indicate sepsis and patient was welling appearing, okay for discharge home.      IMPRESSION  The primary encounter diagnosis was Lab test positive for detection of COVID-19 virus. A diagnosis of Sore throat was also  pertinent to this visit. Strict instructions to follow up with primary care physician or reference provided for further assessment and evaluation. Given instructions to return for any acute symptoms and verbalized understanding of this medical plan.                                 Piedad Liz NP  01/14/22 [id number]

## 2022-01-14 NOTE — Clinical Note
"Morgan"Radha Ulloa was seen and treated in our emergency department on 1/14/2022.  He may return to work on 01/20/2022.  Patient has POSITIVE COVID test and needs to quarantine. He can return on the above date.      If you have any questions or concerns, please don't hesitate to call.      Piedad Liz NP"

## 2022-04-20 ENCOUNTER — HOSPITAL ENCOUNTER (OUTPATIENT)
Facility: OTHER | Age: 37
Discharge: HOME | End: 2022-04-21
Attending: EMERGENCY MEDICINE | Admitting: EMERGENCY MEDICINE

## 2022-04-20 DIAGNOSIS — K57.92 ACUTE DIVERTICULITIS: Primary | ICD-10-CM

## 2022-04-20 LAB
ALBUMIN SERPL BCP-MCNC: 4.3 G/DL (ref 3.5–5.2)
ALP SERPL-CCNC: 84 U/L (ref 55–135)
ALT SERPL W/O P-5'-P-CCNC: 34 U/L (ref 10–44)
ANION GAP SERPL CALC-SCNC: 11 MMOL/L (ref 8–16)
AST SERPL-CCNC: 14 U/L (ref 10–40)
BASOPHILS # BLD AUTO: 0.06 K/UL (ref 0–0.2)
BASOPHILS NFR BLD: 0.4 % (ref 0–1.9)
BILIRUB SERPL-MCNC: 0.7 MG/DL (ref 0.1–1)
BUN SERPL-MCNC: 14 MG/DL (ref 6–20)
CALCIUM SERPL-MCNC: 9.6 MG/DL (ref 8.7–10.5)
CHLORIDE SERPL-SCNC: 110 MMOL/L (ref 95–110)
CO2 SERPL-SCNC: 20 MMOL/L (ref 23–29)
CREAT SERPL-MCNC: 1.3 MG/DL (ref 0.5–1.4)
CREAT SERPL-MCNC: 1.4 MG/DL (ref 0.5–1.4)
DIFFERENTIAL METHOD: ABNORMAL
EOSINOPHIL # BLD AUTO: 0.2 K/UL (ref 0–0.5)
EOSINOPHIL NFR BLD: 1.4 % (ref 0–8)
ERYTHROCYTE [DISTWIDTH] IN BLOOD BY AUTOMATED COUNT: 12.9 % (ref 11.5–14.5)
EST. GFR  (AFRICAN AMERICAN): >60 ML/MIN/1.73 M^2
EST. GFR  (NON AFRICAN AMERICAN): >60 ML/MIN/1.73 M^2
GLUCOSE SERPL-MCNC: 108 MG/DL (ref 70–110)
HCT VFR BLD AUTO: 47.3 % (ref 40–54)
HCV AB SERPL QL IA: NEGATIVE
HGB BLD-MCNC: 15.9 G/DL (ref 14–18)
HIV 1+2 AB+HIV1 P24 AG SERPL QL IA: NEGATIVE
IMM GRANULOCYTES # BLD AUTO: 0.04 K/UL (ref 0–0.04)
IMM GRANULOCYTES NFR BLD AUTO: 0.3 % (ref 0–0.5)
LIPASE SERPL-CCNC: 25 U/L (ref 4–60)
LYMPHOCYTES # BLD AUTO: 1.9 K/UL (ref 1–4.8)
LYMPHOCYTES NFR BLD: 12.8 % (ref 18–48)
MCH RBC QN AUTO: 30.8 PG (ref 27–31)
MCHC RBC AUTO-ENTMCNC: 33.6 G/DL (ref 32–36)
MCV RBC AUTO: 92 FL (ref 82–98)
MONOCYTES # BLD AUTO: 1.1 K/UL (ref 0.3–1)
MONOCYTES NFR BLD: 7.4 % (ref 4–15)
NEUTROPHILS # BLD AUTO: 11.5 K/UL (ref 1.8–7.7)
NEUTROPHILS NFR BLD: 77.7 % (ref 38–73)
NRBC BLD-RTO: 0 /100 WBC
PLATELET # BLD AUTO: 288 K/UL (ref 150–450)
PMV BLD AUTO: 11.1 FL (ref 9.2–12.9)
POTASSIUM SERPL-SCNC: 3.8 MMOL/L (ref 3.5–5.1)
PROT SERPL-MCNC: 7.6 G/DL (ref 6–8.4)
RBC # BLD AUTO: 5.16 M/UL (ref 4.6–6.2)
SAMPLE: NORMAL
SODIUM SERPL-SCNC: 141 MMOL/L (ref 136–145)
WBC # BLD AUTO: 14.79 K/UL (ref 3.9–12.7)

## 2022-04-20 PROCEDURE — 96361 HYDRATE IV INFUSION ADD-ON: CPT

## 2022-04-20 PROCEDURE — 63600175 PHARM REV CODE 636 W HCPCS: Performed by: PHYSICIAN ASSISTANT

## 2022-04-20 PROCEDURE — 25000003 PHARM REV CODE 250: Performed by: EMERGENCY MEDICINE

## 2022-04-20 PROCEDURE — 96365 THER/PROPH/DIAG IV INF INIT: CPT

## 2022-04-20 PROCEDURE — 25500020 PHARM REV CODE 255: Performed by: EMERGENCY MEDICINE

## 2022-04-20 PROCEDURE — 83690 ASSAY OF LIPASE: CPT | Performed by: EMERGENCY MEDICINE

## 2022-04-20 PROCEDURE — 87389 HIV-1 AG W/HIV-1&-2 AB AG IA: CPT | Performed by: EMERGENCY MEDICINE

## 2022-04-20 PROCEDURE — G0378 HOSPITAL OBSERVATION PER HR: HCPCS

## 2022-04-20 PROCEDURE — 99285 EMERGENCY DEPT VISIT HI MDM: CPT | Mod: 25

## 2022-04-20 PROCEDURE — 80053 COMPREHEN METABOLIC PANEL: CPT | Performed by: EMERGENCY MEDICINE

## 2022-04-20 PROCEDURE — 96366 THER/PROPH/DIAG IV INF ADDON: CPT

## 2022-04-20 PROCEDURE — 25000003 PHARM REV CODE 250: Performed by: PHYSICIAN ASSISTANT

## 2022-04-20 PROCEDURE — 96375 TX/PRO/DX INJ NEW DRUG ADDON: CPT

## 2022-04-20 PROCEDURE — 86803 HEPATITIS C AB TEST: CPT | Performed by: EMERGENCY MEDICINE

## 2022-04-20 PROCEDURE — 85025 COMPLETE CBC W/AUTO DIFF WBC: CPT | Performed by: EMERGENCY MEDICINE

## 2022-04-20 PROCEDURE — 63600175 PHARM REV CODE 636 W HCPCS: Performed by: EMERGENCY MEDICINE

## 2022-04-20 RX ORDER — KETOROLAC TROMETHAMINE 30 MG/ML
15 INJECTION, SOLUTION INTRAMUSCULAR; INTRAVENOUS EVERY 6 HOURS PRN
Status: DISCONTINUED | OUTPATIENT
Start: 2022-04-20 | End: 2022-04-21 | Stop reason: HOSPADM

## 2022-04-20 RX ORDER — MORPHINE SULFATE 4 MG/ML
4 INJECTION, SOLUTION INTRAMUSCULAR; INTRAVENOUS
Status: COMPLETED | OUTPATIENT
Start: 2022-04-20 | End: 2022-04-20

## 2022-04-20 RX ORDER — SODIUM CHLORIDE 9 MG/ML
1000 INJECTION, SOLUTION INTRAVENOUS CONTINUOUS
Status: ACTIVE | OUTPATIENT
Start: 2022-04-20 | End: 2022-04-21

## 2022-04-20 RX ORDER — CIPROFLOXACIN 500 MG/1
500 TABLET ORAL 2 TIMES DAILY
Qty: 14 TABLET | Refills: 0 | Status: SHIPPED | OUTPATIENT
Start: 2022-04-20 | End: 2022-04-28

## 2022-04-20 RX ORDER — ACETAMINOPHEN 325 MG/1
650 TABLET ORAL EVERY 8 HOURS PRN
Status: DISCONTINUED | OUTPATIENT
Start: 2022-04-20 | End: 2022-04-21 | Stop reason: HOSPADM

## 2022-04-20 RX ORDER — METRONIDAZOLE 500 MG/1
500 TABLET ORAL EVERY 8 HOURS
Qty: 21 TABLET | Refills: 0 | Status: SHIPPED | OUTPATIENT
Start: 2022-04-20 | End: 2022-04-28

## 2022-04-20 RX ORDER — TALC
6 POWDER (GRAM) TOPICAL NIGHTLY PRN
Status: DISCONTINUED | OUTPATIENT
Start: 2022-04-20 | End: 2022-04-21 | Stop reason: HOSPADM

## 2022-04-20 RX ORDER — MORPHINE SULFATE 4 MG/ML
4 INJECTION, SOLUTION INTRAMUSCULAR; INTRAVENOUS EVERY 4 HOURS PRN
Status: DISCONTINUED | OUTPATIENT
Start: 2022-04-20 | End: 2022-04-21 | Stop reason: HOSPADM

## 2022-04-20 RX ORDER — SODIUM CHLORIDE 0.9 % (FLUSH) 0.9 %
10 SYRINGE (ML) INJECTION
Status: DISCONTINUED | OUTPATIENT
Start: 2022-04-20 | End: 2022-04-21 | Stop reason: HOSPADM

## 2022-04-20 RX ORDER — ONDANSETRON 2 MG/ML
4 INJECTION INTRAMUSCULAR; INTRAVENOUS
Status: DISCONTINUED | OUTPATIENT
Start: 2022-04-20 | End: 2022-04-20

## 2022-04-20 RX ORDER — ONDANSETRON 2 MG/ML
4 INJECTION INTRAMUSCULAR; INTRAVENOUS EVERY 8 HOURS PRN
Status: DISCONTINUED | OUTPATIENT
Start: 2022-04-20 | End: 2022-04-21 | Stop reason: HOSPADM

## 2022-04-20 RX ADMIN — PIPERACILLIN AND TAZOBACTAM 4.5 G: 4; .5 INJECTION, POWDER, LYOPHILIZED, FOR SOLUTION INTRAVENOUS; PARENTERAL at 10:04

## 2022-04-20 RX ADMIN — SODIUM CHLORIDE 1000 ML: 0.9 INJECTION, SOLUTION INTRAVENOUS at 09:04

## 2022-04-20 RX ADMIN — MORPHINE SULFATE 4 MG: 4 INJECTION, SOLUTION INTRAMUSCULAR; INTRAVENOUS at 09:04

## 2022-04-20 RX ADMIN — ONDANSETRON 4 MG: 2 INJECTION INTRAMUSCULAR; INTRAVENOUS at 09:04

## 2022-04-20 RX ADMIN — IOHEXOL 100 ML: 350 INJECTION, SOLUTION INTRAVENOUS at 09:04

## 2022-04-20 NOTE — Clinical Note
Diagnosis: Acute diverticulitis [877727]   Future Attending Provider: SARINA MORENO [[id number]]   Is the patient being sent to ED Observation?: Yes   Admitting Provider:: SARINA MORENO [[id number]]   Special Needs:: No Special Needs [1]

## 2022-04-20 NOTE — Clinical Note
"Morgan Ulloa (JAMIE) was seen and treated in our emergency department on 4/20/2022.  He may return to work on 04/26/2022.       If you have any questions or concerns, please don't hesitate to call.      Jeevan GUAN    "

## 2022-04-21 VITALS
WEIGHT: 232 LBS | TEMPERATURE: 98 F | RESPIRATION RATE: 18 BRPM | SYSTOLIC BLOOD PRESSURE: 122 MMHG | OXYGEN SATURATION: 100 % | DIASTOLIC BLOOD PRESSURE: 62 MMHG | HEART RATE: 85 BPM

## 2022-04-21 LAB
ALBUMIN SERPL BCP-MCNC: 3.7 G/DL (ref 3.5–5.2)
ALP SERPL-CCNC: 76 U/L (ref 55–135)
ALT SERPL W/O P-5'-P-CCNC: 28 U/L (ref 10–44)
ANION GAP SERPL CALC-SCNC: 10 MMOL/L (ref 8–16)
AST SERPL-CCNC: 12 U/L (ref 10–40)
BASOPHILS # BLD AUTO: 0.06 K/UL (ref 0–0.2)
BASOPHILS NFR BLD: 0.5 % (ref 0–1.9)
BILIRUB SERPL-MCNC: 1.6 MG/DL (ref 0.1–1)
BUN SERPL-MCNC: 12 MG/DL (ref 6–20)
CALCIUM SERPL-MCNC: 8.8 MG/DL (ref 8.7–10.5)
CHLORIDE SERPL-SCNC: 109 MMOL/L (ref 95–110)
CO2 SERPL-SCNC: 21 MMOL/L (ref 23–29)
CREAT SERPL-MCNC: 1.3 MG/DL (ref 0.5–1.4)
DIFFERENTIAL METHOD: ABNORMAL
EOSINOPHIL # BLD AUTO: 0.2 K/UL (ref 0–0.5)
EOSINOPHIL NFR BLD: 1.9 % (ref 0–8)
ERYTHROCYTE [DISTWIDTH] IN BLOOD BY AUTOMATED COUNT: 13 % (ref 11.5–14.5)
EST. GFR  (AFRICAN AMERICAN): >60 ML/MIN/1.73 M^2
EST. GFR  (NON AFRICAN AMERICAN): >60 ML/MIN/1.73 M^2
GLUCOSE SERPL-MCNC: 93 MG/DL (ref 70–110)
HCT VFR BLD AUTO: 44.3 % (ref 40–54)
HGB BLD-MCNC: 15 G/DL (ref 14–18)
IMM GRANULOCYTES # BLD AUTO: 0.05 K/UL (ref 0–0.04)
IMM GRANULOCYTES NFR BLD AUTO: 0.4 % (ref 0–0.5)
LYMPHOCYTES # BLD AUTO: 2.3 K/UL (ref 1–4.8)
LYMPHOCYTES NFR BLD: 18.1 % (ref 18–48)
MCH RBC QN AUTO: 30.5 PG (ref 27–31)
MCHC RBC AUTO-ENTMCNC: 33.9 G/DL (ref 32–36)
MCV RBC AUTO: 90 FL (ref 82–98)
MONOCYTES # BLD AUTO: 1.1 K/UL (ref 0.3–1)
MONOCYTES NFR BLD: 8.5 % (ref 4–15)
NEUTROPHILS # BLD AUTO: 9.1 K/UL (ref 1.8–7.7)
NEUTROPHILS NFR BLD: 70.6 % (ref 38–73)
NRBC BLD-RTO: 0 /100 WBC
PLATELET # BLD AUTO: 275 K/UL (ref 150–450)
PMV BLD AUTO: 11.4 FL (ref 9.2–12.9)
POTASSIUM SERPL-SCNC: 4 MMOL/L (ref 3.5–5.1)
PROT SERPL-MCNC: 6.7 G/DL (ref 6–8.4)
RBC # BLD AUTO: 4.92 M/UL (ref 4.6–6.2)
SODIUM SERPL-SCNC: 140 MMOL/L (ref 136–145)
WBC # BLD AUTO: 12.88 K/UL (ref 3.9–12.7)

## 2022-04-21 PROCEDURE — 96361 HYDRATE IV INFUSION ADD-ON: CPT

## 2022-04-21 PROCEDURE — 36415 COLL VENOUS BLD VENIPUNCTURE: CPT | Performed by: PHYSICIAN ASSISTANT

## 2022-04-21 PROCEDURE — 25000003 PHARM REV CODE 250: Performed by: PHYSICIAN ASSISTANT

## 2022-04-21 PROCEDURE — 96376 TX/PRO/DX INJ SAME DRUG ADON: CPT

## 2022-04-21 PROCEDURE — 63600175 PHARM REV CODE 636 W HCPCS: Performed by: PHYSICIAN ASSISTANT

## 2022-04-21 PROCEDURE — 80053 COMPREHEN METABOLIC PANEL: CPT | Performed by: PHYSICIAN ASSISTANT

## 2022-04-21 PROCEDURE — 96375 TX/PRO/DX INJ NEW DRUG ADDON: CPT

## 2022-04-21 PROCEDURE — 85025 COMPLETE CBC W/AUTO DIFF WBC: CPT | Performed by: PHYSICIAN ASSISTANT

## 2022-04-21 PROCEDURE — G0378 HOSPITAL OBSERVATION PER HR: HCPCS

## 2022-04-21 PROCEDURE — 96366 THER/PROPH/DIAG IV INF ADDON: CPT

## 2022-04-21 RX ORDER — SODIUM CHLORIDE 9 MG/ML
1000 INJECTION, SOLUTION INTRAVENOUS CONTINUOUS
Status: DISCONTINUED | OUTPATIENT
Start: 2022-04-21 | End: 2022-04-21

## 2022-04-21 RX ORDER — OXYCODONE HYDROCHLORIDE 5 MG/1
5 CAPSULE ORAL EVERY 4 HOURS PRN
Qty: 8 CAPSULE | Refills: 0 | Status: SHIPPED | OUTPATIENT
Start: 2022-04-21

## 2022-04-21 RX ADMIN — MORPHINE SULFATE 4 MG: 4 INJECTION, SOLUTION INTRAMUSCULAR; INTRAVENOUS at 01:04

## 2022-04-21 RX ADMIN — SODIUM CHLORIDE 1000 ML: 0.9 INJECTION, SOLUTION INTRAVENOUS at 12:04

## 2022-04-21 RX ADMIN — ONDANSETRON 4 MG: 2 INJECTION INTRAMUSCULAR; INTRAVENOUS at 01:04

## 2022-04-21 RX ADMIN — PIPERACILLIN AND TAZOBACTAM 4.5 G: 4; .5 INJECTION, POWDER, LYOPHILIZED, FOR SOLUTION INTRAVENOUS; PARENTERAL at 08:04

## 2022-04-21 RX ADMIN — KETOROLAC TROMETHAMINE 15 MG: 30 INJECTION, SOLUTION INTRAMUSCULAR at 12:04

## 2022-04-21 NOTE — ED PROVIDER NOTES
Source of History:   the patient in via     Chief complaint:  Abdominal Pain (Pt presents to the ER with complaints of pain in the left lower abdomen since yesterday; pt reporting similar pain in the past; states he was diagnosed with an inflamed colon./)      HPI:  Morgan Ulloa is a 37 y.o. male presenting with  Gradual onset of left lower quadrant abdominal pain that started approximately 16-17 hours prior to arrival.  Last night when he went to bed he for was feeling fine.  When he woke up about 4 in the morning and this achy pain.  It is come and gone throughout the day.  Getting up and moving around has made it worse but also occasionally gets worse just sitting down.   The pain is located just in left lower quadrant.  No radiation down to the testicle.  Denies any fevers, nausea vomiting.  He has been able to eat with no any lack of appetite.  Had a normal bowel movement prior to arrival.  Patient states he was seen in the emergency department about a year ago with similar presentation and told he had an inflamed colon.    This is the extent to the patients complaints today here in the emergency department.    ROS: As per HPI and below:  Constitutional: No fever.  No chills.  Eyes: No visual changes.  ENT: No sore throat. No ear pain    Cardiovascular: No chest pain.  Respiratory: No shortness of breath.  GI:   Positive for left lower quadrant abdominal pain.  No nausea vomiting  Genitourinary: No abnormal urination.  Neurologic: No headache. No focal weakness.  No numbness.  MSK: no back pain.  Integument: No rashes or lesions.  Hematologic: No easy bruising.  Endocrine: No excessive thirst or urination.    Review of patient's allergies indicates:  No Known Allergies    PMH:  As per HPI and below:  No past medical history on file.  No past surgical history on file.         Physical Exam:    /62   Pulse 85   Temp 98.2 °F (36.8 °C)   Resp 18   Wt 105.2 kg (232 lb)   SpO2 100%    Nursing note and vital signs reviewed.  Constitutional: No acute distress.  Nontoxic  Eyes: No conjunctival injection.  Extraocular muscles are intact.  ENT: Oropharynx clear.  Normal phonation.  Cardiovascular: Regular rate and rhythm.  No murmurs. No gallops. No rubs  Respiratory: Clear to auscultation bilaterally.  Good air movement.  No wheezes.  No rhonchi. No rales. No accessory muscle use.  Abdomen:   Tenderness to palpation in left lower quadrant with local guarding and peritoneal findings.  Remainder the abdomen is soft, nontender and nondistended.  Negative Temple sign.  No pain at McBurney's point.  :  Uncircumcised.  Testicles are normal size and nontender.  No hernia palpated.  Musculoskeletal: Good range of motion all joints.  No deformities.  Neck supple.  No meningismus.  Skin: No rashes seen.  Good turgor.  No abrasions.  No ecchymoses.  Neuro: alert and oriented x3,  no focal neurological deficits.  Psych: Appropriate, conversant    Labs that have been ordered have been independently reviewed and interpreted by myself.    I decided to obtain the patient's medical records.  Summary of Medical Records:    05/06/2019 seen in the emergency department diagnosed with acute diverticulitis with possible perforation.  Two day hospital stay.  Received IV antibiotics.  Evaluated by General surgery but no indication for surgical intervention.  Improved with antibiotics and discharged home on Cipro and Flagyl.    MDM/ Differential Dx:   37 y.o. male with   Pain left lower quadrant.  Based on my findings I suspect diverticulitis.  Colitis is less likely as he has had no diarrhea.  Denies any blood in his stool.  No findings to suggest cholecystitis or acute appendicitis.   Will get labs, give analgesia and get a CT abdomen pelvis   To evaluate for perforation or abscess.    ED Course as of 04/21/22 1830   Wed Apr 20, 2022 2114 WBC(!): 14.79 [SM]   2114 Hemoglobin: 15.9 [SM]   2114 POC Creatinine: 1.4 [SM]    2126 Lipase: 25 [SM]   2126 Creatinine: 1.3 [SM]   2126 Sodium: 141 [SM]   2211 CT Abdomen Pelvis With Contrast    CT abdomen pelvis she confirms acute diverticulitis.  There is no evidence of abscess or perforation.  Also shows nonobstructing left renal stones. [SM]   2235   Discussed results with the patient.  Still in a moderate amount of pain when ambulating.  I feel it is reasonable to bring into the observation unit overnight for fluids, pain control and antibiotics and discharged tomorrow with follow-up with GI as an outpatient.  Discussed with the patient as well as his wife.  His daughter was  and answered all questions.  They agreed the plan of care. []   Thu Apr 21, 2022   1228 Daughter [phone] [AG]      ED Course User Index  [AG] Luis A Stone PA-C  [] Lyndon George DO               Diagnostic Impression:    1. Acute diverticulitis         ED Disposition Condition    Discharge Stable          ED Prescriptions     Medication Sig Dispense Start Date End Date Auth. Provider    ciprofloxacin HCl (CIPRO) 500 MG tablet Take 1 tablet (500 mg total) by mouth 2 (two) times daily. for 7 days 14 tablet 4/20/2022 4/28/2022 Luis A Stone PA-C    metroNIDAZOLE (FLAGYL) 500 MG tablet Take 1 tablet (500 mg total) by mouth every 8 (eight) hours. for 7 days 21 tablet 4/20/2022 4/28/2022 Luis A Stone PA-C    oxyCODONE (OXY-IR) 5 mg Cap Take 1 capsule (5 mg total) by mouth every 4 (four) hours as needed for Pain. 8 capsule 4/21/2022  Luis A Stone PA-C        Follow-up Information     Follow up With Specialties Details Why Contact Info    [facility]  Schedule an appointment as soon as possible for a visit   Gastroenterology   [address] [facility]   [address] [place]  [address]   Ph: [phone]    [facility]  Schedule an appointment as soon as possible for a visit   [address] [facility] [address] [phone]      [place]  [facility] Gastroenterology Schedule an appointment as soon as possible for a visit   [address] [place] [id number]  [phone]             Lyndon George, DO  04/20/22 2237       Lyndon George, DO  04/21/22 0811

## 2022-04-21 NOTE — ED NOTES
"Patient identifiers verified and correct for   C/C: edou admission for acute diverticulitis. Pt on ivf and abx, stating pain is tolerable 1/10, denies wanting medication. On CL diet, tolerating po fluids well. Pt and RN spoke with wife this AM via cell phone, denied questions stating "we been through this before, we know"  APPEARANCE: awake and alert in NAD.  SKIN: warm, dry and intact. No breakdown or bruising.  MUSCULOSKELETAL: Patient moving all extremities spontaneously, no obvious swelling or deformities noted. Ambulates independently.  RESPIRATORY: Denies shortness of breath.Respirations unlabored.   CARDIAC: Denies CP,  distal pulses; no peripheral edema  ABDOMEN: denies n/v/d, generalized abdominal discomfort.  : voids spontaneously, denies difficulty  Neurologic: AAO x 4; follows commands equal strength in all extremities; denies numbness/tingling. Denies dizziness   "

## 2022-04-21 NOTE — DISCHARGE SUMMARY
ED Observation Unit  Discharge Summary        History of Present Illness:      Morgan Ulloa is a 37 y.o. male who presented to the ED last night with abdominal pain. Gradual onset of left lower quadrant abdominal pain that started approximately 16-17 hours prior to arrival.  Last night when he went to bed he for was feeling fine.  Pain is intermittent and exacerbated with moving around has made it worse but also occasionally gets worse just sitting down.   Denies any fevers, nausea vomiting.  He has been able to eat with no any lack of appetite.  Had a normal bowel movement prior to arrival.  Patient states he was seen in the emergency department about a year ago with similar presentation and was diagnosed with diverticulitis.     In the ED, patient had mild leukocytosis with left shift.  Afebrile.  CT abdomen pelvis revealed acute, uncomplicated diverticulitis of the distal descending colon    Observation Course:    Patient is symptomatic Lucía improved.  Leukocytosis improved.  Pain well controlled.  He does not have insurance but receives medical care from [facility].  Advised to follow up with them to help arrange a gastroenterology follow-up.  Ambulatory GI referral placed.  Sent home with Cipro and Flagyl.    Consultants:    None    Final Diagnosis:  1. Acute diverticulitis      Discharge Condition: Stable    Disposition: Home or Self Care     Time spent on the discharge of the patient including review of hospital course with the patient. reviewing discharge medications and arranging follow-up care 35 minutes.  Patient was seen and examined on the date of discharge and determined to be suitable for discharge.    Follow Up:  No future appointments.

## 2022-04-21 NOTE — H&P
ED Observation Unit  History and Physical      I assumed care of this patient from the Main ED at onset of observation time, 1100 on 04/21/2022.       History of Present Illness:    Morgan Ulloa is a 37 y.o. male who presented to the ED last night with abdominal pain.   Gradual onset of left lower quadrant abdominal pain that started approximately 16-17 hours prior to arrival.  Last night when he went to bed he for was feeling fine.  Pain is intermittent and exacerbated with moving around has made it worse but also occasionally gets worse just sitting down.   Denies any fevers, nausea vomiting.  He has been able to eat with no any lack of appetite.  Had a normal bowel movement prior to arrival.  Patient states he was seen in the emergency department about a year ago with similar presentation and was diagnosed with diverticulitis.    In the ED, patient had mild leukocytosis with left shift.  Afebrile.  CT abdomen pelvis revealed acute, uncomplicated diverticulitis of the distal descending colon    Patient admitted to the ED OU for intractable abdominal pain    I reviewed the ED Provider Note dated 4/21/22 prior to my evaluation of this patient.  I reviewed all labs and imaging performed in the Main ED, prior to patient being placed in Observation.    PMHx   No past medical history on file.   No past surgical history on file.     Family Hx   No family history on file.     Social Hx   Social History     Socioeconomic History    Marital status:         Vital Signs   Vitals:    04/20/22 2121 04/20/22 2131 04/20/22 2132 04/20/22 2232   BP: (!) 146/84  134/76 (!) 154/83   BP Location:       Patient Position:       Pulse: 88 83  86   Resp:       Temp:       TempSrc:       SpO2: 95% 97%  95%   Weight:            Review of Systems  Review of Systems   Constitutional: Negative for chills, fever and malaise/fatigue.   Eyes: Negative for blurred vision.   Respiratory: Negative for shortness of breath.     Cardiovascular: Negative for chest pain.   Gastrointestinal: Positive for abdominal pain. Negative for diarrhea, nausea and vomiting.   Genitourinary: Negative for dysuria.   Musculoskeletal: Negative for myalgias.   Neurological: Negative for dizziness and headaches.   Psychiatric/Behavioral: The patient is not nervous/anxious.        Physical Exam  Physical Exam  Vitals and nursing note reviewed.   Constitutional:       General: He is not in acute distress.     Appearance: He is not ill-appearing or toxic-appearing.   HENT:      Head: Normocephalic and atraumatic.   Eyes:      Extraocular Movements: Extraocular movements intact.   Pulmonary:      Effort: No respiratory distress.      Breath sounds: Normal breath sounds.   Abdominal:      General: Bowel sounds are decreased.      Palpations: Abdomen is soft.      Tenderness: There is abdominal tenderness in the left lower quadrant. There is no guarding or rebound.   Musculoskeletal:         General: Normal range of motion.   Skin:     General: Skin is warm and dry.   Neurological:      Mental Status: He is alert and oriented to person, place, and time.   Psychiatric:         Mood and Affect: Mood normal.         Thought Content: Thought content normal.         Medications:   Scheduled Meds:   piperacillin-tazobactam (ZOSYN) IVPB  4.5 g Intravenous Q8H     Continuous Infusions:   sodium chloride 0.9%       PRN Meds:.acetaminophen, ketorolac, melatonin, morphine, ondansetron, promethazine (PHENERGAN) IVPB, sodium chloride 0.9%      Assessment/Plan:  1) acute, uncomplicated diverticulitis  Plan:  P.r.n. analgesics, clear liquids advance as tolerated, IV Zosyn Q 8 H    No insurance, f/u with [facility].  Referral placed GI.    Case was discussed with the ED provider, Dr. George

## 2023-03-28 ENCOUNTER — PATIENT MESSAGE (OUTPATIENT)
Dept: RESEARCH | Facility: HOSPITAL | Age: 38
End: 2023-03-28

## 2023-08-11 ENCOUNTER — PATIENT MESSAGE (OUTPATIENT)
Dept: RESEARCH | Facility: HOSPITAL | Age: 38
End: 2023-08-11

## 2024-08-09 ENCOUNTER — HOSPITAL ENCOUNTER (EMERGENCY)
Facility: OTHER | Age: 39
Discharge: HOME | End: 2024-08-09
Attending: EMERGENCY MEDICINE

## 2024-08-09 VITALS
WEIGHT: 240 LBS | SYSTOLIC BLOOD PRESSURE: 135 MMHG | RESPIRATION RATE: 17 BRPM | HEIGHT: 66 IN | OXYGEN SATURATION: 96 % | BODY MASS INDEX: 38.57 KG/M2 | HEART RATE: 76 BPM | DIASTOLIC BLOOD PRESSURE: 62 MMHG | TEMPERATURE: 98 F

## 2024-08-09 DIAGNOSIS — N21.0 BLADDER STONE: ICD-10-CM

## 2024-08-09 DIAGNOSIS — R10.9 LEFT FLANK PAIN: Primary | ICD-10-CM

## 2024-08-09 DIAGNOSIS — N23 RENAL COLIC: ICD-10-CM

## 2024-08-09 LAB
ALBUMIN SERPL BCP-MCNC: 4.2 G/DL (ref 3.5–5.2)
ALP SERPL-CCNC: 81 U/L (ref 55–135)
ALT SERPL W/O P-5'-P-CCNC: 37 U/L (ref 10–44)
ANION GAP SERPL CALC-SCNC: 9 MMOL/L (ref 8–16)
AST SERPL-CCNC: 20 U/L (ref 10–40)
BASOPHILS # BLD AUTO: 0.06 K/UL (ref 0–0.2)
BASOPHILS NFR BLD: 0.6 % (ref 0–1.9)
BILIRUB SERPL-MCNC: 0.4 MG/DL (ref 0.1–1)
BILIRUB UR QL STRIP: NEGATIVE
BUN SERPL-MCNC: 14 MG/DL (ref 6–20)
C TRACH DNA SPEC QL NAA+PROBE: NOT DETECTED
CALCIUM SERPL-MCNC: 9.3 MG/DL (ref 8.7–10.5)
CHLORIDE SERPL-SCNC: 107 MMOL/L (ref 95–110)
CLARITY UR: CLEAR
CO2 SERPL-SCNC: 23 MMOL/L (ref 23–29)
COLOR UR: YELLOW
CREAT SERPL-MCNC: 1.5 MG/DL (ref 0.5–1.4)
DIFFERENTIAL METHOD BLD: NORMAL
EOSINOPHIL # BLD AUTO: 0.2 K/UL (ref 0–0.5)
EOSINOPHIL NFR BLD: 2.5 % (ref 0–8)
ERYTHROCYTE [DISTWIDTH] IN BLOOD BY AUTOMATED COUNT: 12.8 % (ref 11.5–14.5)
EST. GFR  (NO RACE VARIABLE): 60 ML/MIN/1.73 M^2
GLUCOSE SERPL-MCNC: 125 MG/DL (ref 70–110)
GLUCOSE UR QL STRIP: NEGATIVE
HCT VFR BLD AUTO: 44.4 % (ref 40–54)
HGB BLD-MCNC: 15.2 G/DL (ref 14–18)
HGB UR QL STRIP: ABNORMAL
IMM GRANULOCYTES # BLD AUTO: 0.04 K/UL (ref 0–0.04)
IMM GRANULOCYTES NFR BLD AUTO: 0.4 % (ref 0–0.5)
KETONES UR QL STRIP: NEGATIVE
LEUKOCYTE ESTERASE UR QL STRIP: NEGATIVE
LIPASE SERPL-CCNC: 34 U/L (ref 4–60)
LYMPHOCYTES # BLD AUTO: 1.8 K/UL (ref 1–4.8)
LYMPHOCYTES NFR BLD: 18.9 % (ref 18–48)
MAGNESIUM SERPL-MCNC: 2.4 MG/DL (ref 1.6–2.6)
MCH RBC QN AUTO: 30 PG (ref 27–31)
MCHC RBC AUTO-ENTMCNC: 34.2 G/DL (ref 32–36)
MCV RBC AUTO: 88 FL (ref 82–98)
MICROSCOPIC COMMENT: ABNORMAL
MONOCYTES # BLD AUTO: 0.7 K/UL (ref 0.3–1)
MONOCYTES NFR BLD: 7.8 % (ref 4–15)
N GONORRHOEA DNA SPEC QL NAA+PROBE: NOT DETECTED
NEUTROPHILS # BLD AUTO: 6.5 K/UL (ref 1.8–7.7)
NEUTROPHILS NFR BLD: 69.8 % (ref 38–73)
NITRITE UR QL STRIP: NEGATIVE
NRBC BLD-RTO: 0 /100 WBC
PH UR STRIP: 6 [PH] (ref 5–8)
PLATELET # BLD AUTO: 277 K/UL (ref 150–450)
PMV BLD AUTO: 10.9 FL (ref 9.2–12.9)
POTASSIUM SERPL-SCNC: 4.1 MMOL/L (ref 3.5–5.1)
PROT SERPL-MCNC: 7.7 G/DL (ref 6–8.4)
PROT UR QL STRIP: ABNORMAL
RBC # BLD AUTO: 5.07 M/UL (ref 4.6–6.2)
RBC #/AREA URNS HPF: >100 /HPF (ref 0–4)
SODIUM SERPL-SCNC: 139 MMOL/L (ref 136–145)
SP GR UR STRIP: 1.01 (ref 1–1.03)
SQUAMOUS #/AREA URNS HPF: 0 /HPF
URN SPEC COLLECT METH UR: ABNORMAL
UROBILINOGEN UR STRIP-ACNC: NEGATIVE EU/DL
WBC # BLD AUTO: 9.27 K/UL (ref 3.9–12.7)
WBC #/AREA URNS HPF: 4 /HPF (ref 0–5)

## 2024-08-09 PROCEDURE — 25000003 PHARM REV CODE 250: Performed by: EMERGENCY MEDICINE

## 2024-08-09 PROCEDURE — 83690 ASSAY OF LIPASE: CPT | Performed by: EMERGENCY MEDICINE

## 2024-08-09 PROCEDURE — 99285 EMERGENCY DEPT VISIT HI MDM: CPT | Mod: 25

## 2024-08-09 PROCEDURE — 83735 ASSAY OF MAGNESIUM: CPT | Performed by: EMERGENCY MEDICINE

## 2024-08-09 PROCEDURE — 87591 N.GONORRHOEAE DNA AMP PROB: CPT | Performed by: EMERGENCY MEDICINE

## 2024-08-09 PROCEDURE — 85025 COMPLETE CBC W/AUTO DIFF WBC: CPT | Performed by: EMERGENCY MEDICINE

## 2024-08-09 PROCEDURE — 80053 COMPREHEN METABOLIC PANEL: CPT | Performed by: EMERGENCY MEDICINE

## 2024-08-09 PROCEDURE — 96360 HYDRATION IV INFUSION INIT: CPT

## 2024-08-09 PROCEDURE — 81000 URINALYSIS NONAUTO W/SCOPE: CPT | Performed by: EMERGENCY MEDICINE

## 2024-08-09 PROCEDURE — 96361 HYDRATE IV INFUSION ADD-ON: CPT

## 2024-08-09 RX ADMIN — SODIUM CHLORIDE 1000 ML: 9 INJECTION, SOLUTION INTRAVENOUS at 01:08

## 2024-08-09 NOTE — Clinical Note
"Morgan RAY" Artemio was seen and treated in our emergency department on 8/9/2024.  He may return to work on 08/10/2024.       If you have any questions or concerns, please don't hesitate to call.      Naila Lin MD"

## 2024-08-09 NOTE — ED NOTES
"Morgan Ulloa, a 39 y.o. male presents to the ED with L flank pain,  concerns including dribbling, dysuria, concern for kidney stone in urine. Symptom onset at 2300 earlier tonight. Pt states pain comes in waves, asymptomatic at this time. Family at bedside.    Pt resting comfortably. ED workup in progress. Call light within reach. Safety measures in place. Denies further needs. Plan of care ongoing.      Chief Complaint   Patient presents with    Flank Pain     Left flank pain that began approx 2 hours ago. +Dysuria/"dripping a little when I try to pee." No medications taken PTA.     Review of patient's allergies indicates:  No Known Allergies  No past medical history on file.  No past surgical history on file.    "

## 2025-04-23 NOTE — ED PROVIDER NOTES
"Encounter Date: 8/9/2024       History     Chief Complaint   Patient presents with    Flank Pain     Left flank pain that began approx 2 hours ago. +Dysuria/"dripping a little when I try to pee." No medications taken PTA.     38yo male without relevant PMH presents via family to [facility] ER with acute onset pain to L testicle radiating to L flank starting around 11pm.  Patient was on the couch and got up to urinate.  While he was urinating, pain began.  Patient denies hematuria.  Pain was severe but resolved completely in about 1-2 hours.  Patient reports associated nausea but no vomiting.  He never had anything similar in the past.    Patient's son is at bedside translating.  I offered [employer] translation but patient declined.      Review of patient's allergies indicates:  No Known Allergies  History reviewed. No pertinent past medical history.  History reviewed. No pertinent surgical history.  No family history on file.  Social History     Tobacco Use    Smokeless tobacco: Never   Substance Use Topics    Alcohol use: Yes    Drug use: Never     Review of Systems   Constitutional:  Negative for fever.   HENT:  Negative for rhinorrhea and sore throat.    Eyes:  Negative for visual disturbance.   Respiratory:  Negative for cough and shortness of breath.    Cardiovascular:  Negative for chest pain.   Gastrointestinal:  Positive for nausea.   Genitourinary:  Positive for flank pain (left).   Musculoskeletal:  Negative for gait problem.   Skin:  Negative for rash.   Neurological:  Negative for syncope.       Physical Exam     Initial Vitals [08/09/24 0046]   BP Pulse Resp Temp SpO2   108/71 (!) 129 18 100.1 °F (37.8 °C) 100 %      MAP       --         Physical Exam    Nursing note and vitals reviewed.  Constitutional: He appears well-developed and well-nourished. He is not diaphoretic.   Awake, alert, nontoxic   HENT:   Head: Normocephalic and atraumatic.   Mouth/Throat: Oropharynx is clear and moist.   Eyes: " Called and spoke with patient, informed of below. No questions at this time.     Conjunctivae and EOM are normal. Pupils are equal, round, and reactive to light.   Neck: Neck supple.   Normal range of motion.  Cardiovascular:  Normal rate, regular rhythm, normal heart sounds and intact distal pulses.           No murmur heard.  Pulmonary/Chest: Breath sounds normal. No respiratory distress. He has no wheezes. He has no rhonchi. He has no rales.   Abdominal: Abdomen is soft. There is no abdominal tenderness.   Musculoskeletal:         General: No tenderness or edema. Normal range of motion.      Cervical back: Normal range of motion and neck supple.     Neurological: He is alert and oriented to person, place, and time.   Moving all extremities   Skin: Skin is warm and dry.   Psychiatric: He has a normal mood and affect. Judgment normal.         ED Course   Procedures  Labs Reviewed   COMPREHENSIVE METABOLIC PANEL - Abnormal       Result Value    Sodium 139      Potassium 4.1      Chloride 107      CO2 23      Glucose 125 (*)     BUN 14      Creatinine 1.5 (*)     Calcium 9.3      Total Protein 7.7      Albumin 4.2      Total Bilirubin 0.4      Alkaline Phosphatase 81      AST 20      ALT 37      eGFR 60      Anion Gap 9     URINALYSIS, REFLEX TO URINE CULTURE - Abnormal    Specimen UA Urine, Clean Catch      Color, UA Yellow      Appearance, UA Clear      pH, UA 6.0      Specific Gravity, UA 1.015      Protein, UA Trace (*)     Glucose, UA Negative      Ketones, UA Negative      Bilirubin (UA) Negative      Occult Blood UA 3+ (*)     Nitrite, UA Negative      Urobilinogen, UA Negative      Leukocytes, UA Negative      Narrative:     Specimen Source->Urine   URINALYSIS MICROSCOPIC - Abnormal    RBC, UA >100 (*)     WBC, UA 4      Squam Epithel, UA 0      Microscopic Comment SEE COMMENT      Narrative:     Specimen Source->Urine   CBC W/ AUTO DIFFERENTIAL    WBC 9.27      RBC 5.07      Hemoglobin 15.2      Hematocrit 44.4      MCV 88      MCH 30.0      MCHC 34.2      RDW 12.8      Platelets 277       MPV 10.9      Immature Granulocytes 0.4      Gran # (ANC) 6.5      Immature Grans (Abs) 0.04      Lymph # 1.8      Mono # 0.7      Eos # 0.2      Baso # 0.06      nRBC 0      Gran % 69.8      Lymph % 18.9      Mono % 7.8      Eosinophil % 2.5      Basophil % 0.6      Differential Method Automated     LIPASE   MAGNESIUM   MAGNESIUM    Magnesium 2.4     LIPASE    Lipase 34     C. TRACHOMATIS/N. GONORRHOEAE BY AMP DNA          Imaging Results              CT Renal Stone Study ABD Pelvis WO (Final result)  Result time 08/09/24 02:01:52      Final result by Enrrique Noriega MD (08/09/24 02:01:52)                   Impression:      Bilateral nonobstructing renal calculi, left more than right, similar compared to prior.    No ureteral calculus or hydroureteronephrosis.  4 mm calculus in the urinary bladder, new from prior, possible recently passed calculus.  Correlate clinically.    Diverticulosis coli.  No CT findings of acute diverticulitis.      Electronically signed by: Enrrique Noriega MD  Date:    08/09/2024  Time:    02:01               Narrative:    EXAMINATION:  CT RENAL STONE STUDY ABD PELVIS WO    CLINICAL HISTORY:  Flank pain, kidney stone suspected;    TECHNIQUE:  Low dose axial images, sagittal and coronal reformations were obtained from the lung bases to the pubic symphysis, Oral contrast was not administered.    COMPARISON:  04/20/2022.    FINDINGS:  Heart: Normal in size. No pericardial effusion.    Lung Bases: Well aerated, without consolidation or pleural fluid.    Liver: Normal in size and attenuation, with no focal hepatic lesions.    Gallbladder: No calcified gallstones.    Bile Ducts: No evidence of dilated ducts.    Pancreas: No mass or peripancreatic fat stranding.    Spleen: Unremarkable.    Adrenals: Unremarkable.    Kidneys/ Ureters: Bilateral nonobstructing renal calculi, left more than right, similar compared to prior.  Largest in the upper pole left kidney measuring 1 cm.  No  "hydroureteronephrosis or ureteral calculus.  4 mm calculus in the urinary bladder posteriorly.    Bladder: No evidence of wall thickening.    Reproductive organs: Unremarkable.    GI Tract/Mesentery: No evidence of bowel obstruction or inflammation.  Normal appendix.  Colonic diverticulosis.    Peritoneal Space: No ascites. No free air.    Retroperitoneum: No significant adenopathy.    Abdominal wall: Unremarkable.    Vasculature: No significant atherosclerosis or aneurysm.    Bones: No acute fracture.                                       Medications   sodium chloride 0.9% bolus 1,000 mL 1,000 mL (0 mLs Intravenous Stopped 8/9/24 1470)     Medical Decision Making  39 y.o. male with acute left testicular pain radiating to left flank, onset while urinating.    Ddx includes UTI, pyelonephritis, STI, torsion, renal colic, other.    BUN 14 / creatinine 1.5, eGFR 60, top normal.  Otherwise CBC, CMP, lipase reassuring.  UA 3+ occult blood, no evidence of infection.    CT renal stone study: "No ureteral calculus or hydroureteronephrosis.  4 mm calculus in the urinary bladder, new from prior, possible recently passed calculus."    Patient received IV NS 1L while in ER.  No other meds.    I discussed results with patient via son; patient's wife also present for discussion.  I suspect renal colic with stone now in bladder.  I have explained pathology and suggested patient hydrate significantly to avoid similar episodes in the future.  Patient and family voiced understanding.      Amount and/or Complexity of Data Reviewed  Labs: ordered.  Radiology: ordered.                                      Clinical Impression:  Final diagnoses:  [R10.9] Left flank pain (Primary)  [N23] Renal colic  [N21.0] Bladder stone          ED Disposition Condition    Discharge Stable          ED Prescriptions    None       Follow-up Information       Follow up With Specialties Details Why Contact Info    [facility]    [address] " [facility] [address]  [phone]               Naila Lin MD  08/09/24 0172